# Patient Record
Sex: FEMALE | Race: WHITE | HISPANIC OR LATINO | Employment: UNEMPLOYED | URBAN - METROPOLITAN AREA
[De-identification: names, ages, dates, MRNs, and addresses within clinical notes are randomized per-mention and may not be internally consistent; named-entity substitution may affect disease eponyms.]

---

## 2017-11-30 ENCOUNTER — OFFICE VISIT (OUTPATIENT)
Dept: OBSTETRICS AND GYNECOLOGY | Facility: CLINIC | Age: 62
End: 2017-11-30
Payer: MEDICAID

## 2017-11-30 VITALS
BODY MASS INDEX: 27.22 KG/M2 | SYSTOLIC BLOOD PRESSURE: 134 MMHG | HEIGHT: 61 IN | DIASTOLIC BLOOD PRESSURE: 80 MMHG | WEIGHT: 144.19 LBS

## 2017-11-30 DIAGNOSIS — Z01.419 WELL WOMAN EXAM WITH ROUTINE GYNECOLOGICAL EXAM: ICD-10-CM

## 2017-11-30 DIAGNOSIS — Z12.4 ROUTINE PAPANICOLAOU SMEAR: ICD-10-CM

## 2017-11-30 DIAGNOSIS — Z12.39 BREAST CANCER SCREENING: Primary | ICD-10-CM

## 2017-11-30 PROCEDURE — 99386 PREV VISIT NEW AGE 40-64: CPT | Mod: S$PBB,,, | Performed by: OBSTETRICS & GYNECOLOGY

## 2017-11-30 PROCEDURE — 88175 CYTOPATH C/V AUTO FLUID REDO: CPT

## 2017-11-30 PROCEDURE — 99203 OFFICE O/P NEW LOW 30 MIN: CPT | Mod: PBBFAC,PO | Performed by: OBSTETRICS & GYNECOLOGY

## 2017-11-30 PROCEDURE — 99999 PR PBB SHADOW E&M-NEW PATIENT-LVL III: CPT | Mod: PBBFAC,,, | Performed by: OBSTETRICS & GYNECOLOGY

## 2017-11-30 RX ORDER — NATEGLINIDE 60 MG/1
60 TABLET ORAL
COMMUNITY

## 2017-11-30 RX ORDER — METOPROLOL SUCCINATE 25 MG/1
25 TABLET, EXTENDED RELEASE ORAL DAILY
COMMUNITY

## 2017-11-30 NOTE — PROGRESS NOTES
"HPI:   62 y.o.   OB History      Para Term  AB Living    3 3       3    SAB TAB Ectopic Multiple Live Births                    No LMP recorded. Patient is postmenopausal.    Patient is  here for her annual gynecologic exam.  She has no complaints.     ROS:  GENERAL: No fever, chills, fatigability or weight loss.  SKIN: No rashes, itching or changes in color or texture of skin.  HEAD: No headaches or recent head trauma.  EYES: Visual acuity fine. No photophobia, ocular pain or diplopia.  EARS: Denies ear pain, discharge or vertigo.  NOSE: No loss of smell, no epistaxis or postnasal drip.  MOUTH & THROAT: No hoarseness or change in voice. No excessive gum bleeding.  NODES: Denies swollen glands.  CHEST: Denies RENEE, cyanosis, wheezing, cough and sputum production.  CARDIOVASCULAR: Denies chest pain, PND, orthopnea or reduced exercise tolerance.  ABDOMEN: Appetite fine. No weight loss. Denies diarrhea, abdominal pain, hematemesis or blood in stool.  URINARY: No flank pain, dysuria or hematuria.  PERIPHERAL VASCULAR: No claudication or cyanosis.  MUSCULOSKELETAL: No joint stiffness or swelling. Denies back pain.  NEUROLOGIC: No history of seizures, paralysis, alteration of gait or coordination.    PE:   /80   Ht 5' 1" (1.549 m)   Wt 65.4 kg (144 lb 2.9 oz)   BMI 27.24 kg/m²   APPEARANCE: Well nourished, well developed, in no acute distress.  NECK: Neck symmetric without masses or thyromegaly.  BREASTS: Symmetrical, no skin changes or visible lesions. No palpable masses, nipple discharge or adenopathy bilaterally.  ABDOMEN: Flat. Soft. No tenderness or masses. No hepatosplenomegaly. No hernias. No CVA tenderness.  VULVA: No lesions. Normal female genitalia.  URETHRAL MEATUS: Normal size and location, no lesions, no prolapse.  URETHRA: No masses, tenderness, prolapse or scarring.  VAGINA: Moist and well rugated, no discharge, no significant cystocele or rectocele.  CERVIX: No lesions and discharge. " PAP done.  UTERUS: Normal size, regular shape, mobile, non-tender, bladder base nontender.  ADNEXA: No masses, tenderness or CDS nodularity.  ANUS PERINEUM: Normal.    PROCEDURES:  Pap smear    Assessment:  Normal Gynecologic Exam    Plan:  Mammogram and Colonoscopy if indicated by current recommendations.  Return to clinic in one year or for any problems or complaints.  No bleeding or gyn co  mammo

## 2017-12-18 ENCOUNTER — HOSPITAL ENCOUNTER (OUTPATIENT)
Dept: RADIOLOGY | Facility: HOSPITAL | Age: 62
Discharge: HOME | End: 2017-12-18
Attending: OBSTETRICS & GYNECOLOGY
Payer: MEDICAID

## 2017-12-18 DIAGNOSIS — Z12.39 BREAST CANCER SCREENING: ICD-10-CM

## 2017-12-18 PROCEDURE — 77067 SCR MAMMO BI INCL CAD: CPT | Mod: TC

## 2017-12-18 PROCEDURE — 77067 SCR MAMMO BI INCL CAD: CPT | Mod: 26,,, | Performed by: RADIOLOGY

## 2017-12-18 PROCEDURE — 77063 BREAST TOMOSYNTHESIS BI: CPT | Mod: 26,,, | Performed by: RADIOLOGY

## 2022-05-04 ENCOUNTER — TELEPHONE (OUTPATIENT)
Dept: OPHTHALMOLOGY | Facility: CLINIC | Age: 67
End: 2022-05-04
Payer: MEDICAID

## 2022-05-19 ENCOUNTER — OFFICE VISIT (OUTPATIENT)
Dept: OPHTHALMOLOGY | Facility: CLINIC | Age: 67
End: 2022-05-19
Payer: MEDICARE

## 2022-05-19 DIAGNOSIS — H04.163 PROLAPSE OF LACRIMAL GLAND, BILATERAL: ICD-10-CM

## 2022-05-19 DIAGNOSIS — H02.59 LAXITY OF EYELID: ICD-10-CM

## 2022-05-19 DIAGNOSIS — H02.413 ACQUIRED MECHANICAL PTOSIS OF EYELID, BILATERAL: Primary | ICD-10-CM

## 2022-05-19 DIAGNOSIS — H02.832 DERMATOCHALASIS OF BOTH LOWER EYELIDS: ICD-10-CM

## 2022-05-19 DIAGNOSIS — H02.835 DERMATOCHALASIS OF BOTH LOWER EYELIDS: ICD-10-CM

## 2022-05-19 PROCEDURE — 1101F PR PT FALLS ASSESS DOC 0-1 FALLS W/OUT INJ PAST YR: ICD-10-PCS | Mod: CPTII,S$GLB,, | Performed by: OPHTHALMOLOGY

## 2022-05-19 PROCEDURE — 1101F PT FALLS ASSESS-DOCD LE1/YR: CPT | Mod: CPTII,S$GLB,, | Performed by: OPHTHALMOLOGY

## 2022-05-19 PROCEDURE — 3288F FALL RISK ASSESSMENT DOCD: CPT | Mod: CPTII,S$GLB,, | Performed by: OPHTHALMOLOGY

## 2022-05-19 PROCEDURE — 1159F MED LIST DOCD IN RCRD: CPT | Mod: CPTII,S$GLB,, | Performed by: OPHTHALMOLOGY

## 2022-05-19 PROCEDURE — 1126F PR PAIN SEVERITY QUANTIFIED, NO PAIN PRESENT: ICD-10-PCS | Mod: CPTII,S$GLB,, | Performed by: OPHTHALMOLOGY

## 2022-05-19 PROCEDURE — 3288F PR FALLS RISK ASSESSMENT DOCUMENTED: ICD-10-PCS | Mod: CPTII,S$GLB,, | Performed by: OPHTHALMOLOGY

## 2022-05-19 PROCEDURE — 99204 OFFICE O/P NEW MOD 45 MIN: CPT | Mod: S$GLB,,, | Performed by: OPHTHALMOLOGY

## 2022-05-19 PROCEDURE — 1160F RVW MEDS BY RX/DR IN RCRD: CPT | Mod: CPTII,S$GLB,, | Performed by: OPHTHALMOLOGY

## 2022-05-19 PROCEDURE — 99999 PR PBB SHADOW E&M-EST. PATIENT-LVL II: CPT | Mod: PBBFAC,,, | Performed by: OPHTHALMOLOGY

## 2022-05-19 PROCEDURE — 1159F PR MEDICATION LIST DOCUMENTED IN MEDICAL RECORD: ICD-10-PCS | Mod: CPTII,S$GLB,, | Performed by: OPHTHALMOLOGY

## 2022-05-19 PROCEDURE — 1160F PR REVIEW ALL MEDS BY PRESCRIBER/CLIN PHARMACIST DOCUMENTED: ICD-10-PCS | Mod: CPTII,S$GLB,, | Performed by: OPHTHALMOLOGY

## 2022-05-19 PROCEDURE — 1126F AMNT PAIN NOTED NONE PRSNT: CPT | Mod: CPTII,S$GLB,, | Performed by: OPHTHALMOLOGY

## 2022-05-19 PROCEDURE — 99204 PR OFFICE/OUTPT VISIT, NEW, LEVL IV, 45-59 MIN: ICD-10-PCS | Mod: S$GLB,,, | Performed by: OPHTHALMOLOGY

## 2022-05-19 PROCEDURE — 99999 PR PBB SHADOW E&M-EST. PATIENT-LVL II: ICD-10-PCS | Mod: PBBFAC,,, | Performed by: OPHTHALMOLOGY

## 2022-05-19 NOTE — PROGRESS NOTES
ZOEY Keller is a/an 67 y.o. female here for ptosis eval.  Referred by:   How long have eyelid(s) been droopy? 5+ Years  Any h/o wearing hard CLs? No  Do eyelids feel heavy? Yes OS>OD  Does the height of the eyelid(s) fluctuate throughout the day? Yes  Do eyelid(s) interfere with daily activities such as driving, reading,   working? Yes  Spontaneous orbital pain? No  Orbital pain w eye movement? No  Red eyes? Yes  Red eyelids? No  Eyelid swelling? No     EYE MEDS:   Art tears 1 gtt PRN OU       Last edited by Kirstie Rodriguez on 5/19/2022  3:16 PM. (History)            Assessment /Plan     For exam results, see Encounter Report.    Acquired mechanical ptosis of eyelid, bilateral  -     Goldmann Perimetry - OU - Extended - Both Eyes  -     External/Slit Lamp Photography    Prolapse of lacrimal gland, bilateral    Dermatochalasis of both lower eyelids    Laxity of eyelid      The patient is a pleasant 67-year-old female here for evaluation of bilateral upper eyelid heaviness worse on the left than the right.  This has been progressive and affects activities of daily living.  The patient has a history of bilateral cataract extraction with my colleague Dr. Stone approximately 2 years ago.  She is here for surgical evaluation.    On exam, the patient has chronic frontalis use.  She has very minimal bilateral temporal brow ptosis slightly worse on the left than the right.  She has bilateral upper eyelid mechanical ptosis with skin lash touch.  She has bilateral lacrimal gland prolapse more prominent on the right versus the left.  She has bilateral lower eyelid dermatochalasis with herniation of orbital fat more prominently on the right than the left.  She has moderate to severe lateral canthal laxity.  She has a left lower eyelid fluid-filled cyst at the temporal margin.    Pt. With significant improvement of superior visual fields with lids and brows taped vs. Untaped.    These findings were discussed  with the patient and her .    Recommend bilateral upper eyelid blepharoplasty and bilateral lacrimal gland plication along with excisional biopsy of the left lower eyelid cyst.    Option of cosmetic bilateral lower eyelid subjective blepharoplasty with pinch and canthoplasty was discussed with the patient.  She will decide dependent upon the cost the procedure.    Informed consent obtained after extensive risks/benefits/alternatives were discussed with the patient including but not limited to pain, bleeding, infection, ocular injury, loss of the eye, asymmetry, need for revision in future, scarring.  Alternatives such as waiting were discussed.  All questions were answered.      Hold ASA, NSAIDS, and fish oil 5 to 7 days prior to procedure.    Return for surgery     Return to Dr. Stone for routine eye care.

## 2022-05-27 ENCOUNTER — TELEPHONE (OUTPATIENT)
Dept: OPHTHALMOLOGY | Facility: CLINIC | Age: 67
End: 2022-05-27
Payer: MEDICARE

## 2022-05-27 DIAGNOSIS — H02.59 LAXITY OF EYELID: ICD-10-CM

## 2022-05-27 DIAGNOSIS — H02.413 ACQUIRED MECHANICAL PTOSIS OF EYELID, BILATERAL: Primary | ICD-10-CM

## 2022-05-27 DIAGNOSIS — H02.832 DERMATOCHALASIS OF BOTH LOWER EYELIDS: ICD-10-CM

## 2022-05-27 DIAGNOSIS — H04.163 PROLAPSE OF LACRIMAL GLAND, BILATERAL: ICD-10-CM

## 2022-05-27 DIAGNOSIS — H02.835 DERMATOCHALASIS OF BOTH LOWER EYELIDS: ICD-10-CM

## 2022-11-03 ENCOUNTER — TELEPHONE (OUTPATIENT)
Dept: OPHTHALMOLOGY | Facility: CLINIC | Age: 67
End: 2022-11-03
Payer: MEDICARE

## 2022-11-10 ENCOUNTER — TELEPHONE (OUTPATIENT)
Dept: OPHTHALMOLOGY | Facility: CLINIC | Age: 67
End: 2022-11-10
Payer: MEDICARE

## 2022-11-11 ENCOUNTER — ANESTHESIA (OUTPATIENT)
Dept: SURGERY | Facility: HOSPITAL | Age: 67
End: 2022-11-11
Payer: MEDICARE

## 2022-11-11 ENCOUNTER — HOSPITAL ENCOUNTER (OUTPATIENT)
Facility: HOSPITAL | Age: 67
Discharge: HOME | End: 2022-11-11
Attending: OPHTHALMOLOGY | Admitting: OPHTHALMOLOGY
Payer: MEDICARE

## 2022-11-11 ENCOUNTER — ANESTHESIA EVENT (OUTPATIENT)
Dept: SURGERY | Facility: HOSPITAL | Age: 67
End: 2022-11-11
Payer: MEDICARE

## 2022-11-11 VITALS
OXYGEN SATURATION: 100 % | BODY MASS INDEX: 30.21 KG/M2 | TEMPERATURE: 99 F | DIASTOLIC BLOOD PRESSURE: 61 MMHG | WEIGHT: 160 LBS | SYSTOLIC BLOOD PRESSURE: 128 MMHG | HEIGHT: 61 IN | RESPIRATION RATE: 16 BRPM | HEART RATE: 80 BPM

## 2022-11-11 DIAGNOSIS — H02.413 ACQUIRED MECHANICAL PTOSIS OF EYELID, BILATERAL: Primary | ICD-10-CM

## 2022-11-11 LAB — POCT GLUCOSE: 184 MG/DL (ref 70–110)

## 2022-11-11 PROCEDURE — 68899: ICD-10-PCS | Mod: 51,50,, | Performed by: OPHTHALMOLOGY

## 2022-11-11 PROCEDURE — D9220A PRA ANESTHESIA: ICD-10-PCS | Mod: ANES,,, | Performed by: STUDENT IN AN ORGANIZED HEALTH CARE EDUCATION/TRAINING PROGRAM

## 2022-11-11 PROCEDURE — 88305 TISSUE EXAM BY PATHOLOGIST: CPT | Mod: 26,,, | Performed by: PATHOLOGY

## 2022-11-11 PROCEDURE — 82962 GLUCOSE BLOOD TEST: CPT | Performed by: OPHTHALMOLOGY

## 2022-11-11 PROCEDURE — 71000044 HC DOSC ROUTINE RECOVERY FIRST HOUR: Performed by: OPHTHALMOLOGY

## 2022-11-11 PROCEDURE — D9220A PRA ANESTHESIA: Mod: CRNA,,, | Performed by: REGISTERED NURSE

## 2022-11-11 PROCEDURE — 36000706: Performed by: OPHTHALMOLOGY

## 2022-11-11 PROCEDURE — D9220A PRA ANESTHESIA: ICD-10-PCS | Mod: CRNA,,, | Performed by: REGISTERED NURSE

## 2022-11-11 PROCEDURE — 25000003 PHARM REV CODE 250: Performed by: REGISTERED NURSE

## 2022-11-11 PROCEDURE — 15823 BLEPHARP UPR EYELID XCSV SKN: CPT | Mod: 50,,, | Performed by: OPHTHALMOLOGY

## 2022-11-11 PROCEDURE — 71000016 HC POSTOP RECOV ADDL HR: Performed by: OPHTHALMOLOGY

## 2022-11-11 PROCEDURE — 36000707: Performed by: OPHTHALMOLOGY

## 2022-11-11 PROCEDURE — 11440 PR EXC SKIN BENIG <0.5 CM FACE,FACIAL: ICD-10-PCS | Mod: 51,LT,, | Performed by: OPHTHALMOLOGY

## 2022-11-11 PROCEDURE — 15823 PR REV UPPER EYELID W EXCESS SKIN: ICD-10-PCS | Mod: 50,,, | Performed by: OPHTHALMOLOGY

## 2022-11-11 PROCEDURE — 25000003 PHARM REV CODE 250: Performed by: OPHTHALMOLOGY

## 2022-11-11 PROCEDURE — 63600175 PHARM REV CODE 636 W HCPCS: Performed by: OPHTHALMOLOGY

## 2022-11-11 PROCEDURE — 27201423 OPTIME MED/SURG SUP & DEVICES STERILE SUPPLY: Performed by: OPHTHALMOLOGY

## 2022-11-11 PROCEDURE — 63600175 PHARM REV CODE 636 W HCPCS: Performed by: REGISTERED NURSE

## 2022-11-11 PROCEDURE — 71000015 HC POSTOP RECOV 1ST HR: Performed by: OPHTHALMOLOGY

## 2022-11-11 PROCEDURE — 37000008 HC ANESTHESIA 1ST 15 MINUTES: Performed by: OPHTHALMOLOGY

## 2022-11-11 PROCEDURE — 25000003 PHARM REV CODE 250

## 2022-11-11 PROCEDURE — D9220A PRA ANESTHESIA: Mod: ANES,,, | Performed by: STUDENT IN AN ORGANIZED HEALTH CARE EDUCATION/TRAINING PROGRAM

## 2022-11-11 PROCEDURE — 88305 TISSUE EXAM BY PATHOLOGIST: ICD-10-PCS | Mod: 26,,, | Performed by: PATHOLOGY

## 2022-11-11 PROCEDURE — 88305 TISSUE EXAM BY PATHOLOGIST: CPT | Performed by: PATHOLOGY

## 2022-11-11 PROCEDURE — 68899 UNLISTED PX LACRIMAL SYSTEM: CPT | Mod: 51,50,, | Performed by: OPHTHALMOLOGY

## 2022-11-11 PROCEDURE — 11440 EXC FACE-MM B9+MARG 0.5 CM/<: CPT | Mod: 51,LT,, | Performed by: OPHTHALMOLOGY

## 2022-11-11 PROCEDURE — 37000009 HC ANESTHESIA EA ADD 15 MINS: Performed by: OPHTHALMOLOGY

## 2022-11-11 RX ORDER — BUPIVACAINE HYDROCHLORIDE 5 MG/ML
INJECTION, SOLUTION EPIDURAL; INTRACAUDAL
Status: DISCONTINUED
Start: 2022-11-11 | End: 2022-11-11 | Stop reason: HOSPADM

## 2022-11-11 RX ORDER — LIDOCAINE HYDROCHLORIDE 20 MG/ML
INJECTION INTRAVENOUS
Status: DISCONTINUED | OUTPATIENT
Start: 2022-11-11 | End: 2022-11-11

## 2022-11-11 RX ORDER — EPINEPHRINE 1 MG/ML
INJECTION, SOLUTION, CONCENTRATE INTRAVENOUS
Status: DISCONTINUED
Start: 2022-11-11 | End: 2022-11-11 | Stop reason: HOSPADM

## 2022-11-11 RX ORDER — ACETAMINOPHEN 10 MG/ML
INJECTION, SOLUTION INTRAVENOUS
Status: DISCONTINUED | OUTPATIENT
Start: 2022-11-11 | End: 2022-11-11

## 2022-11-11 RX ORDER — HYDROCODONE BITARTRATE AND ACETAMINOPHEN 5; 325 MG/1; MG/1
1 TABLET ORAL EVERY 6 HOURS PRN
Qty: 16 TABLET | Refills: 0 | Status: SHIPPED | OUTPATIENT
Start: 2022-11-11

## 2022-11-11 RX ORDER — TOBRAMYCIN AND DEXAMETHASONE 3; 1 MG/ML; MG/ML
SUSPENSION/ DROPS OPHTHALMIC
Status: DISCONTINUED
Start: 2022-11-11 | End: 2022-11-11 | Stop reason: WASHOUT

## 2022-11-11 RX ORDER — PROPOFOL 10 MG/ML
VIAL (ML) INTRAVENOUS
Status: DISCONTINUED | OUTPATIENT
Start: 2022-11-11 | End: 2022-11-11

## 2022-11-11 RX ORDER — NEOMYCIN SULFATE, POLYMYXIN B SULFATE, AND DEXAMETHASONE 3.5; 10000; 1 MG/G; [USP'U]/G; MG/G
OINTMENT OPHTHALMIC EVERY 8 HOURS
Qty: 3.5 G | Refills: 2 | Status: SHIPPED | OUTPATIENT
Start: 2022-11-11

## 2022-11-11 RX ORDER — CEFAZOLIN SODIUM/WATER 2 G/20 ML
SYRINGE (ML) INTRAVENOUS
Status: DISCONTINUED
Start: 2022-11-11 | End: 2022-11-11 | Stop reason: HOSPADM

## 2022-11-11 RX ORDER — LIDOCAINE HYDROCHLORIDE 10 MG/ML
INJECTION, SOLUTION EPIDURAL; INFILTRATION; INTRACAUDAL; PERINEURAL
Status: DISCONTINUED | OUTPATIENT
Start: 2022-11-11 | End: 2022-11-11 | Stop reason: HOSPADM

## 2022-11-11 RX ORDER — BUPIVACAINE HYDROCHLORIDE 5 MG/ML
INJECTION, SOLUTION EPIDURAL; INTRACAUDAL
Status: DISCONTINUED | OUTPATIENT
Start: 2022-11-11 | End: 2022-11-11 | Stop reason: HOSPADM

## 2022-11-11 RX ORDER — EPINEPHRINE CONVENIENCE KIT 1 MG/ML(1)
KIT INTRAMUSCULAR; SUBCUTANEOUS
Status: DISCONTINUED | OUTPATIENT
Start: 2022-11-11 | End: 2022-11-11 | Stop reason: HOSPADM

## 2022-11-11 RX ORDER — TOBRAMYCIN AND DEXAMETHASONE 3; 1 MG/ML; MG/ML
SUSPENSION/ DROPS OPHTHALMIC
Status: DISCONTINUED | OUTPATIENT
Start: 2022-11-11 | End: 2022-11-11 | Stop reason: HOSPADM

## 2022-11-11 RX ORDER — VASOPRESSIN 20 [USP'U]/ML
INJECTION, SOLUTION INTRAMUSCULAR; SUBCUTANEOUS
Status: DISCONTINUED | OUTPATIENT
Start: 2022-11-11 | End: 2022-11-11

## 2022-11-11 RX ORDER — TETRACAINE HYDROCHLORIDE 5 MG/ML
1 SOLUTION OPHTHALMIC ONCE
Status: DISCONTINUED | OUTPATIENT
Start: 2022-11-11 | End: 2022-11-11

## 2022-11-11 RX ORDER — ONDANSETRON 2 MG/ML
INJECTION INTRAMUSCULAR; INTRAVENOUS
Status: DISCONTINUED | OUTPATIENT
Start: 2022-11-11 | End: 2022-11-11

## 2022-11-11 RX ORDER — TETRACAINE HYDROCHLORIDE 5 MG/ML
1 SOLUTION OPHTHALMIC ONCE
Status: COMPLETED | OUTPATIENT
Start: 2022-11-11 | End: 2022-11-11

## 2022-11-11 RX ORDER — DEXAMETHASONE SODIUM PHOSPHATE 4 MG/ML
INJECTION, SOLUTION INTRA-ARTICULAR; INTRALESIONAL; INTRAMUSCULAR; INTRAVENOUS; SOFT TISSUE
Status: DISCONTINUED | OUTPATIENT
Start: 2022-11-11 | End: 2022-11-11

## 2022-11-11 RX ORDER — HYDROCODONE BITARTRATE AND ACETAMINOPHEN 5; 325 MG/1; MG/1
1 TABLET ORAL EVERY 4 HOURS PRN
Status: DISCONTINUED | OUTPATIENT
Start: 2022-11-11 | End: 2022-11-11 | Stop reason: HOSPADM

## 2022-11-11 RX ORDER — LIDOCAINE HYDROCHLORIDE 10 MG/ML
INJECTION INFILTRATION; PERINEURAL
Status: DISCONTINUED
Start: 2022-11-11 | End: 2022-11-11 | Stop reason: HOSPADM

## 2022-11-11 RX ORDER — HYDROMORPHONE HYDROCHLORIDE 1 MG/ML
0.2 INJECTION, SOLUTION INTRAMUSCULAR; INTRAVENOUS; SUBCUTANEOUS EVERY 5 MIN PRN
Status: DISCONTINUED | OUTPATIENT
Start: 2022-11-11 | End: 2022-11-11 | Stop reason: HOSPADM

## 2022-11-11 RX ORDER — PHENYLEPHRINE HYDROCHLORIDE 10 MG/ML
INJECTION INTRAVENOUS
Status: DISCONTINUED | OUTPATIENT
Start: 2022-11-11 | End: 2022-11-11

## 2022-11-11 RX ORDER — DEXMEDETOMIDINE HYDROCHLORIDE 100 UG/ML
INJECTION, SOLUTION INTRAVENOUS
Status: DISCONTINUED | OUTPATIENT
Start: 2022-11-11 | End: 2022-11-11

## 2022-11-11 RX ORDER — MIDAZOLAM HYDROCHLORIDE 1 MG/ML
INJECTION INTRAMUSCULAR; INTRAVENOUS
Status: DISCONTINUED | OUTPATIENT
Start: 2022-11-11 | End: 2022-11-11

## 2022-11-11 RX ORDER — FENTANYL CITRATE 50 UG/ML
INJECTION, SOLUTION INTRAMUSCULAR; INTRAVENOUS
Status: DISCONTINUED | OUTPATIENT
Start: 2022-11-11 | End: 2022-11-11

## 2022-11-11 RX ORDER — SODIUM CHLORIDE 0.9 % (FLUSH) 0.9 %
10 SYRINGE (ML) INJECTION
Status: DISCONTINUED | OUTPATIENT
Start: 2022-11-11 | End: 2022-11-11 | Stop reason: HOSPADM

## 2022-11-11 RX ADMIN — DEXAMETHASONE SODIUM PHOSPHATE 8 MG: 4 INJECTION, SOLUTION INTRAMUSCULAR; INTRAVENOUS at 10:11

## 2022-11-11 RX ADMIN — PHENYLEPHRINE HYDROCHLORIDE 100 MCG: 10 INJECTION INTRAVENOUS at 11:11

## 2022-11-11 RX ADMIN — PROPOFOL 50 MG: 10 INJECTION, EMULSION INTRAVENOUS at 10:11

## 2022-11-11 RX ADMIN — TETRACAINE HYDROCHLORIDE 1 DROP: 5 SOLUTION OPHTHALMIC at 08:11

## 2022-11-11 RX ADMIN — PHENYLEPHRINE HYDROCHLORIDE 100 MCG: 10 INJECTION INTRAVENOUS at 10:11

## 2022-11-11 RX ADMIN — ACETAMINOPHEN 1000 MG: 10 INJECTION, SOLUTION INTRAVENOUS at 11:11

## 2022-11-11 RX ADMIN — Medication 2 G: at 10:11

## 2022-11-11 RX ADMIN — MIDAZOLAM HYDROCHLORIDE 2 MG: 1 INJECTION, SOLUTION INTRAMUSCULAR; INTRAVENOUS at 09:11

## 2022-11-11 RX ADMIN — HYDROCODONE BITARTRATE AND ACETAMINOPHEN 1 TABLET: 5; 325 TABLET ORAL at 01:11

## 2022-11-11 RX ADMIN — VASOPRESSIN 0.8 UNITS: 20 INJECTION INTRAVENOUS at 11:11

## 2022-11-11 RX ADMIN — VASOPRESSIN 0.4 UNITS: 20 INJECTION INTRAVENOUS at 11:11

## 2022-11-11 RX ADMIN — FENTANYL CITRATE 25 MCG: 50 INJECTION, SOLUTION INTRAMUSCULAR; INTRAVENOUS at 10:11

## 2022-11-11 RX ADMIN — FENTANYL CITRATE 25 MCG: 50 INJECTION, SOLUTION INTRAMUSCULAR; INTRAVENOUS at 11:11

## 2022-11-11 RX ADMIN — PROPOFOL 200 MG: 10 INJECTION, EMULSION INTRAVENOUS at 10:11

## 2022-11-11 RX ADMIN — SODIUM CHLORIDE: 0.9 INJECTION, SOLUTION INTRAVENOUS at 09:11

## 2022-11-11 RX ADMIN — LIDOCAINE HYDROCHLORIDE 100 MG: 20 INJECTION INTRAVENOUS at 10:11

## 2022-11-11 RX ADMIN — ONDANSETRON 4 MG: 2 INJECTION INTRAMUSCULAR; INTRAVENOUS at 11:11

## 2022-11-11 RX ADMIN — FENTANYL CITRATE 50 MCG: 50 INJECTION, SOLUTION INTRAMUSCULAR; INTRAVENOUS at 10:11

## 2022-11-11 RX ADMIN — DEXMEDETOMIDINE HYDROCHLORIDE 8 MCG: 100 INJECTION, SOLUTION INTRAVENOUS at 11:11

## 2022-11-11 NOTE — INTERVAL H&P NOTE
No interval change in h and p. Proceed as planned.     PE:    HEENT: Normocephalic, atraumatic  CV: RRR nl s1 and s2  Chest: CTA-B  Abd: s/nt/nd  Ext: warm, perfused

## 2022-11-11 NOTE — ANESTHESIA PROCEDURE NOTES
Intubation    Date/Time: 11/11/2022 10:09 AM  Performed by: Kashmir Mijares CRNA  Authorized by: Shavon Quach MD     Intubation:     Induction:  Intravenous    Intubated:  Postinduction    Mask Ventilation:  Easy mask    Attempts:  1    Attempted By:  CRNA    Difficult Airway Encountered?: No      Complications:  None    Airway Device:  Supraglottic airway/LMA    Airway Device Size:  3.5 (airQ)    Style/Cuff Inflation:  Cuffed (inflated to minimal occlusive pressure)    Secured at:  The lips    Placement Verified By:  Capnometry    Complicating Factors:  None    Findings Post-Intubation:  Atraumatic/condition of teeth unchanged

## 2022-11-11 NOTE — OP NOTE
11/11/22  Attending: Roseanna Larry  Resident: none  Pre-op Dx: bilateral mechanical ptosis, bilateral lacrimal gland prolapse, left lower eyelid cyst   Post-op Dx: same  Procedure: bilateral lacrimal gland plication, bilateral blepharoplasty, excision of left lower eyelid cyst   Anesthesia: Local and General 1% lidocaine with epinephrine, 0.5% marcaine, and vitrase  Ebl: less than 5 cc   Specimens: left lower eyelid lesion  Complications: none      Indications for surgery: 67 y.o. female with significant impairment of superior visual fields with prolapse of bilateral lacrimal glands. Informed consent obtained after extensive risks/benefits/alternatives were discussed with the patient including but not limited to pain, bleeding, infection, ocular injury, loss of the eye, asymmetry, need for revision in future, scarring.  Alternatives such as waiting were discussed.  All questions were answered.       The eyelids were marked with a marking pen within the eyelid creases and from the brow 10 mm medially, centrally, and laterally. Local anesthetic was injected into both upper eyelids.The patient was prepped and draped in the usual sterile manner for ophthalmic plastic surgery. Corneal shields were placed in each eye. A yang scissor was used to excise the left lower eyelid lesion and sent for pathology. Attention was first placed to the right eyelid. A 4-0 Silk traction suture was placed into the eyelid. A #15 blade was used to incise the marked skin and then raise and remove the blepharoplasty skin, taking care to preserve the underlying orbicularis oculi. Hemostasis was achieved with bovie electrocautery. Sharp Yang scissor were then used to incise the orbicularis and orbital septum exposing the medial and central fat pads and the prolapsed lacrimal gland.Monopolar cautery was used to remove the superior septal fibers releasing the medial fat pad. A small pedicle of the medial fat pad was exposed, and  electrocautery was used to excise this pedicle at its base. The lacrimal gland was visualized and injected with 0.5 cc of local anesthetic. The lacrimal gland was teased away from the surrounding orbital fat. Next, three interrupted 6-0 prolene sutures were placed through the anterior lacrimal gland and then anchored back to the rim of the lacrimal gland fossa and tied.  After hemostasis was ensured, a 6-0 prolene running suture was used to close the skin of the incisions. This procedure was repeated in its entirety on the left upper eyelid. The corneal shields were removed and traction sutures cut and removed. Tobradex ointment was applied to the wounds and onto the eyes. The patient tolerated the procedure well. There were no complications.

## 2022-11-11 NOTE — TRANSFER OF CARE
"Anesthesia Transfer of Care Note    Patient: Kelin Keller    Procedure(s) Performed: Procedure(s) (LRB):  BLEPHAROPLASTY, UPPER EYELID (Bilateral)  EXPLORATION, ORBIT (Bilateral)  EXCISION, LESION, EYELID (Left)    Patient location: PACU    Anesthesia Type: general    Transport from OR: Transported from OR on 6-10 L/min O2 by face mask with adequate spontaneous ventilation    Post pain: adequate analgesia    Post assessment: no apparent anesthetic complications and tolerated procedure well    Post vital signs: stable    Level of consciousness: sedated and responds to stimulation    Nausea/Vomiting: no nausea/vomiting    Complications: none    Transfer of care protocol was followed      Last vitals:   Visit Vitals  BP (!) 176/80 (BP Location: Left arm, Patient Position: Lying)   Pulse 92   Temp 37.2 °C (98.9 °F) (Temporal)   Resp 18   Ht 5' 1" (1.549 m)   Wt 72.6 kg (160 lb)   LMP 01/01/2006   SpO2 98%   Breastfeeding No   BMI 30.23 kg/m²     "

## 2022-11-11 NOTE — H&P
Past Medical History:   Diagnosis Date    Diabetes mellitus     Hypertension        Past Surgical History:   Procedure Laterality Date    deverticulitis  11/03/2003       No family history on file.    Social History     Socioeconomic History    Marital status:    Tobacco Use    Smoking status: Never    Smokeless tobacco: Never   Substance and Sexual Activity    Alcohol use: No    Drug use: No    Sexual activity: Never       No current facility-administered medications for this encounter.     Current Outpatient Medications   Medication Sig Dispense Refill    ESOMEPRAZOLE MAGNESIUM (NEXIUM 24HR ORAL) Take by mouth.      metoprolol succinate (TOPROL-XL) 25 MG 24 hr tablet Take 25 mg by mouth once daily.      nateglinide (STARLIX) 60 MG tablet Take 60 mg by mouth 3 (three) times daily before meals.         Review of patient's allergies indicates:   Allergen Reactions    Microzide [hydrochlorothiazide] Diarrhea     Potasium and sodium levels go extremely low when this medication is taken per Patient        68 yo female with bilateral mechanical ptosis.     PE:    HEENT: Normocephalic, atraumatic  CV: RRR nl s1 and s2  Chest: CTA-B  Abd: s/nt/nd  Ext: warm, perfused     A/p: To OR for bilateral blepharoplasty and lacrimal gland plication and excisional biopsy of left lower eyelid lesion.

## 2022-11-11 NOTE — ANESTHESIA POSTPROCEDURE EVALUATION
Anesthesia Post Evaluation    Patient: Kelin Keller    Procedure(s) Performed: Procedure(s) (LRB):  BLEPHAROPLASTY, UPPER EYELID (Bilateral)  EXPLORATION, ORBIT (Bilateral)  EXCISION, LESION, EYELID (Left)    Final Anesthesia Type: general      Patient location during evaluation: PACU  Patient participation: Yes- Able to Participate  Level of consciousness: awake and alert  Post-procedure vital signs: reviewed and stable  Pain management: adequate  Airway patency: patent  JAMARCUS mitigation strategies: Multimodal analgesia  PONV status at discharge: No PONV  Anesthetic complications: no      Cardiovascular status: blood pressure returned to baseline and hemodynamically stable  Respiratory status: unassisted, spontaneous ventilation and room air  Hydration status: euvolemic  Follow-up not needed.          Vitals Value Taken Time   /61 11/11/22 1316   Temp  11/11/22 1525   Pulse 80 11/11/22 1330   Resp 16 11/11/22 1330   SpO2 100 % 11/11/22 1330         No case tracking events are documented in the log.      Pain/Darwin Score: Pain Rating Prior to Med Admin: 5 (11/11/2022  1:09 PM)  Darwin Score: 9 (11/11/2022 12:15 PM)

## 2022-11-11 NOTE — BRIEF OP NOTE
Attending: Roseanna Larry  Resident: none  Pre-op Dx: bilateral mechanical ptosis, bilateral lacrimal gland prolapse  Post-op Dx: same  Procedure: bilateral lacrimal gland plication, bilateral blepharoplasty, excision of left lower eyelid cyst   Anesthesia: Local and General 1% lidocaine with epinephrine, 0.5% marcaine, and vitrase  Ebl: less than 5 cc   Specimens: left lower eyelid lesion  Complications: none

## 2022-11-11 NOTE — ANESTHESIA PREPROCEDURE EVALUATION
Pre-operative evaluation for Procedure(s) (LRB):  BLEPHAROPLASTY, UPPER EYELID (Bilateral)  EXPLORATION, ORBIT (Bilateral)  EXCISION, LESION, EYELID (Left)    Kelin Keller is a 67 y.o. female with HTN, DM and bilateral ptosis presenting for the above mentioned procedures.      There is no problem list on file for this patient.      Review of patient's allergies indicates:   Allergen Reactions    Microzide [hydrochlorothiazide] Diarrhea     Potasium and sodium levels go extremely low when this medication is taken per Patient        No current facility-administered medications on file prior to encounter.     Current Outpatient Medications on File Prior to Encounter   Medication Sig Dispense Refill    ESOMEPRAZOLE MAGNESIUM (NEXIUM 24HR ORAL) Take by mouth.      metoprolol succinate (TOPROL-XL) 25 MG 24 hr tablet Take 25 mg by mouth once daily.      nateglinide (STARLIX) 60 MG tablet Take 60 mg by mouth 3 (three) times daily before meals.         Past Surgical History:   Procedure Laterality Date    deverticulitis  11/03/2003       Social History     Socioeconomic History    Marital status:    Tobacco Use    Smoking status: Never    Smokeless tobacco: Never   Substance and Sexual Activity    Alcohol use: No    Drug use: No    Sexual activity: Never         Vital Signs Range (Last 24H):  Temp:  [37.2 °C (98.9 °F)]   Pulse:  [92]   Resp:  [18]   BP: (176)/(80)   SpO2:  [98 %]       CBC: No results for input(s): WBC, RBC, HGB, HCT, PLT, MCV, MCH, MCHC in the last 72 hours.    CMP: No results for input(s): NA, K, CL, CO2, BUN, CREATININE, GLU, MG, PHOS, CALCIUM, ALBUMIN, PROT, ALKPHOS, ALT, AST, BILITOT in the last 72 hours.    INR  No results for input(s): PT, INR, PROTIME, APTT in the last 72 hours.      Diagnostic Studies: None charted      Pre-op Assessment    I have reviewed the Patient Summary Reports.     I have reviewed the Nursing Notes. I have reviewed the NPO Status.   I have reviewed the  Medications.     Review of Systems  Anesthesia Hx:  No problems with previous Anesthesia  History of prior surgery of interest to airway management or planning: Denies Family Hx of Anesthesia complications.   Denies Personal Hx of Anesthesia complications.   Social:  No Alcohol Use, Non-Smoker    Hematology/Oncology:  Hematology Normal   Oncology Normal     EENT/Dental:EENT/Dental Normal   Cardiovascular:   Hypertension    Pulmonary:  Pulmonary Normal    Renal/:  Renal/ Normal     Hepatic/GI:  Hepatic/GI Normal    Musculoskeletal:  Musculoskeletal Normal    Neurological:  Neurology Normal    Endocrine:   Diabetes  Obesity / BMI > 30  Dermatological:  Skin Normal    Psych:  Psychiatric Normal           Physical Exam  General: Well nourished, Cooperative, Alert and Oriented  obese  Airway:  Mallampati: III   Mouth Opening: Normal  TM Distance: Normal  Tongue: Normal  Neck ROM: Normal ROM    Dental:  Intact        Anesthesia Plan  Type of Anesthesia, risks & benefits discussed:    Anesthesia Type: Gen Supraglottic Airway  Intra-op Monitoring Plan: Standard ASA Monitors  Post Op Pain Control Plan: multimodal analgesia  Induction:  IV  Airway Plan: , Post-Induction  Informed Consent: Informed consent signed with the Patient and all parties understand the risks and agree with anesthesia plan.  All questions answered. Patient consented to blood products? No  ASA Score: 2  Day of Surgery Review of History & Physical: H&P Update referred to the surgeon/provider.    Ready For Surgery From Anesthesia Perspective.     .

## 2022-11-11 NOTE — PLAN OF CARE
Discharge instructions given and explained to patient and  with verbalization of understanding all instructions. Prescription given and explained next time and doses of each medication. Patients v/s stable, denies n/v and tolerating po, rates pain level tolerable, IV removed, and family at bedside for patient discharge home.

## 2022-11-15 NOTE — DISCHARGE SUMMARY
Discharge Note  Short Stay      SUMMARY     Admit Date: 11/11/2022    Attending Physician: Roseanna Larry MD    Discharge Physician: Roseanna Larry MD    Discharge Date: 11/15/2022 4:45 PM    Final Diagnosis: Post-Op Diagnosis Codes:     * Acquired mechanical ptosis of eyelid, bilateral [H02.413]     * Prolapse of lacrimal gland, bilateral [H04.163]     * Dermatochalasis of both lower eyelids [H02.832, H02.835]     * Laxity of eyelid [H02.59]    Hospital Course: The patient underwent uncomplicated eyelid surgery under general anesthesia and was discharged after recovery to be followed as an outpatient in the clinic.    Disposition: discharged home    Patient Instructions:   Discharge Medication List as of 11/11/2022  1:38 PM        START taking these medications    Details   HYDROcodone-acetaminophen (NORCO) 5-325 mg per tablet Take 1 tablet by mouth every 6 (six) hours as needed for Pain., Starting Fri 11/11/2022, Print      neomycin-polymyxin-dexamethasone (MAXITROL) 3.5 mg/g-10,000 unit/g-0.1 % Oint Place into both eyes every 8 (eight) hours., Starting Fri 11/11/2022, Normal           CONTINUE these medications which have NOT CHANGED    Details   ESOMEPRAZOLE MAGNESIUM (NEXIUM 24HR ORAL) Take by mouth., Historical Med      metoprolol succinate (TOPROL-XL) 25 MG 24 hr tablet Take 25 mg by mouth once daily., Historical Med      nateglinide (STARLIX) 60 MG tablet Take 60 mg by mouth 3 (three) times daily before meals., Historical Med             Discharge Procedure Orders (must include Diet, Follow-up, Activity):   Discharge Procedure Orders (must include Diet, Follow-up, Activity)   Diet general     Call MD for:  temperature >100.4     Call MD for:  severe uncontrolled pain     Call MD for:  persistent nausea and vomiting

## 2022-11-17 ENCOUNTER — TELEPHONE (OUTPATIENT)
Dept: OPHTHALMOLOGY | Facility: CLINIC | Age: 67
End: 2022-11-17

## 2022-11-17 ENCOUNTER — OFFICE VISIT (OUTPATIENT)
Dept: OPHTHALMOLOGY | Facility: CLINIC | Age: 67
End: 2022-11-17
Payer: MEDICARE

## 2022-11-17 DIAGNOSIS — H02.832 DERMATOCHALASIS OF BOTH LOWER EYELIDS: ICD-10-CM

## 2022-11-17 DIAGNOSIS — H04.163 PROLAPSE OF LACRIMAL GLAND, BILATERAL: ICD-10-CM

## 2022-11-17 DIAGNOSIS — Z98.890 POST-OPERATIVE STATE: Primary | ICD-10-CM

## 2022-11-17 DIAGNOSIS — H02.835 DERMATOCHALASIS OF BOTH LOWER EYELIDS: ICD-10-CM

## 2022-11-17 DIAGNOSIS — H02.413 ACQUIRED MECHANICAL PTOSIS OF EYELID, BILATERAL: ICD-10-CM

## 2022-11-17 PROCEDURE — 99024 POSTOP FOLLOW-UP VISIT: CPT | Mod: S$GLB,,, | Performed by: OPHTHALMOLOGY

## 2022-11-17 PROCEDURE — 99999 PR PBB SHADOW E&M-EST. PATIENT-LVL II: ICD-10-PCS | Mod: PBBFAC,,, | Performed by: OPHTHALMOLOGY

## 2022-11-17 PROCEDURE — 1160F PR REVIEW ALL MEDS BY PRESCRIBER/CLIN PHARMACIST DOCUMENTED: ICD-10-PCS | Mod: CPTII,S$GLB,, | Performed by: OPHTHALMOLOGY

## 2022-11-17 PROCEDURE — 1159F MED LIST DOCD IN RCRD: CPT | Mod: CPTII,S$GLB,, | Performed by: OPHTHALMOLOGY

## 2022-11-17 PROCEDURE — 1159F PR MEDICATION LIST DOCUMENTED IN MEDICAL RECORD: ICD-10-PCS | Mod: CPTII,S$GLB,, | Performed by: OPHTHALMOLOGY

## 2022-11-17 PROCEDURE — 1160F RVW MEDS BY RX/DR IN RCRD: CPT | Mod: CPTII,S$GLB,, | Performed by: OPHTHALMOLOGY

## 2022-11-17 PROCEDURE — 99999 PR PBB SHADOW E&M-EST. PATIENT-LVL II: CPT | Mod: PBBFAC,,, | Performed by: OPHTHALMOLOGY

## 2022-11-17 PROCEDURE — 99024 PR POST-OP FOLLOW-UP VISIT: ICD-10-PCS | Mod: S$GLB,,, | Performed by: OPHTHALMOLOGY

## 2022-11-17 NOTE — PROGRESS NOTES
Assessment /Plan     For exam results, see Encounter Report.    Post-operative state  -     External Photography - OU - Both Eyes  -     SUTURE REMOVAL    Acquired mechanical ptosis of eyelid, bilateral    Dermatochalasis of both lower eyelids    Prolapse of lacrimal gland, bilateral    Patient doing well! Post-operative instructions reviewed. Sutures removed. All questions answered.  Return in 5 weeks prn sooner any worsening of vision/symptoms or any concerns.

## 2022-11-25 LAB
FINAL PATHOLOGIC DIAGNOSIS: NORMAL
Lab: NORMAL

## 2023-01-10 ENCOUNTER — OFFICE VISIT (OUTPATIENT)
Dept: OPHTHALMOLOGY | Facility: CLINIC | Age: 68
End: 2023-01-10
Payer: MEDICARE

## 2023-01-10 DIAGNOSIS — H04.163 PROLAPSE OF LACRIMAL GLAND, BILATERAL: ICD-10-CM

## 2023-01-10 DIAGNOSIS — H02.413 ACQUIRED MECHANICAL PTOSIS OF EYELID, BILATERAL: ICD-10-CM

## 2023-01-10 DIAGNOSIS — Z98.890 POST-OPERATIVE STATE: Primary | ICD-10-CM

## 2023-01-10 PROCEDURE — 1160F PR REVIEW ALL MEDS BY PRESCRIBER/CLIN PHARMACIST DOCUMENTED: ICD-10-PCS | Mod: CPTII,S$GLB,, | Performed by: OPHTHALMOLOGY

## 2023-01-10 PROCEDURE — 99999 PR PBB SHADOW E&M-EST. PATIENT-LVL II: ICD-10-PCS | Mod: PBBFAC,,, | Performed by: OPHTHALMOLOGY

## 2023-01-10 PROCEDURE — 92285 EXTERNAL PHOTOGRAPHY - OU - BOTH EYES: ICD-10-PCS | Mod: S$GLB,,, | Performed by: OPHTHALMOLOGY

## 2023-01-10 PROCEDURE — 99999 PR PBB SHADOW E&M-EST. PATIENT-LVL II: CPT | Mod: PBBFAC,,, | Performed by: OPHTHALMOLOGY

## 2023-01-10 PROCEDURE — 1159F MED LIST DOCD IN RCRD: CPT | Mod: CPTII,S$GLB,, | Performed by: OPHTHALMOLOGY

## 2023-01-10 PROCEDURE — 1160F RVW MEDS BY RX/DR IN RCRD: CPT | Mod: CPTII,S$GLB,, | Performed by: OPHTHALMOLOGY

## 2023-01-10 PROCEDURE — 1159F PR MEDICATION LIST DOCUMENTED IN MEDICAL RECORD: ICD-10-PCS | Mod: CPTII,S$GLB,, | Performed by: OPHTHALMOLOGY

## 2023-01-10 PROCEDURE — 99024 PR POST-OP FOLLOW-UP VISIT: ICD-10-PCS | Mod: S$GLB,,, | Performed by: OPHTHALMOLOGY

## 2023-01-10 PROCEDURE — 99024 POSTOP FOLLOW-UP VISIT: CPT | Mod: S$GLB,,, | Performed by: OPHTHALMOLOGY

## 2023-01-10 PROCEDURE — 92285 EXTERNAL OCULAR PHOTOGRAPHY: CPT | Mod: S$GLB,,, | Performed by: OPHTHALMOLOGY

## 2023-01-10 NOTE — PROGRESS NOTES
Assessment /Plan     For exam results, see Encounter Report.    Post-operative state  -     External Photography - OU - Both Eyes    Acquired mechanical ptosis of eyelid, bilateral    Prolapse of lacrimal gland, bilateral      Patient doing well! Post-operative instructions reviewed. All questions answered. Return prn sooner any worsening of vision/symptoms or any concerns.

## 2023-04-17 ENCOUNTER — TELEPHONE (OUTPATIENT)
Dept: OPHTHALMOLOGY | Facility: CLINIC | Age: 68
End: 2023-04-17
Payer: MEDICARE

## 2023-04-17 NOTE — TELEPHONE ENCOUNTER
----- Message from Audrey Yan sent at 4/17/2023  3:16 PM CDT -----  Regarding: appt access  Contact: self @ [phone]  Pt is calling to schedule her annual f/u but there is nothing available.  Pls call pt with an appt.

## 2024-02-20 LAB
BCS RECOMMENDATION EXT: NORMAL
BCS RECOMMENDATION EXT: NORMAL

## 2024-05-22 ENCOUNTER — OFFICE VISIT (OUTPATIENT)
Dept: URGENT CARE | Facility: CLINIC | Age: 69
End: 2024-05-22
Payer: MEDICARE

## 2024-05-22 VITALS
HEART RATE: 106 BPM | TEMPERATURE: 98 F | WEIGHT: 160.06 LBS | HEIGHT: 61 IN | BODY MASS INDEX: 30.22 KG/M2 | OXYGEN SATURATION: 95 % | SYSTOLIC BLOOD PRESSURE: 138 MMHG | DIASTOLIC BLOOD PRESSURE: 84 MMHG | RESPIRATION RATE: 19 BRPM

## 2024-05-22 DIAGNOSIS — T88.7XXA MEDICATION SIDE EFFECT: Primary | ICD-10-CM

## 2024-05-22 DIAGNOSIS — R11.0 NAUSEA: ICD-10-CM

## 2024-05-22 PROCEDURE — 99203 OFFICE O/P NEW LOW 30 MIN: CPT | Mod: S$GLB,,,

## 2024-05-22 PROCEDURE — S0119 ONDANSETRON 4 MG: HCPCS | Mod: S$GLB,,,

## 2024-05-22 RX ORDER — ONDANSETRON 4 MG/1
4 TABLET, ORALLY DISINTEGRATING ORAL
Status: COMPLETED | OUTPATIENT
Start: 2024-05-22 | End: 2024-05-22

## 2024-05-22 RX ORDER — ONDANSETRON 4 MG/1
4 TABLET, ORALLY DISINTEGRATING ORAL ONCE
Qty: 14 TABLET | Refills: 0 | Status: SHIPPED | OUTPATIENT
Start: 2024-05-22 | End: 2024-05-22

## 2024-05-22 RX ADMIN — ONDANSETRON 4 MG: 4 TABLET, ORALLY DISINTEGRATING ORAL at 03:05

## 2024-05-22 NOTE — PROGRESS NOTES
"Subjective:      Patient ID: Kelin Keller is a 69 y.o. female.    Vitals:  height is 5' 1" (1.549 m) and weight is 72.6 kg (160 lb 0.9 oz). Her oral temperature is 97.9 °F (36.6 °C). Her blood pressure is 138/84 and her pulse is 106. Her respiration is 19 and oxygen saturation is 95%.     Chief Complaint: Emesis    Pt comes in with emesis, states her primary care doctor prescribed her a new medication (RYBELSUS) for her blood sugar, states she took it it on an empty stomach and now she's vomiting, pt states sx started today, denies at home tx.     Provider note:    69 F c/o nausea and vomiting today after starting an oral semaglutide (rybelsus) yesterday prescribed by her pcp. She took the medication on an empty stomach as prescribed. She called her pcp and plans to see them after her UC visit. Her last blood glucose was 179 at 130 pm today. Hx of type two diabetes    Emesis   This is a new problem. The current episode started today. The problem occurs 2 to 4 times per day. The problem has been unchanged. There has been no fever. Associated symptoms include chills and sweats. Pertinent negatives include no abdominal pain, arthralgias, chest pain, coughing, decreased urine volume, diarrhea, dizziness, fever, headaches, myalgias, URI or weight loss. She has tried nothing for the symptoms.       Constitution: Positive for chills. Negative for fever.   Cardiovascular:  Negative for chest pain.   Respiratory:  Negative for cough.    Gastrointestinal:  Positive for nausea and vomiting. Negative for abdominal pain, constipation and diarrhea.   Genitourinary:  Negative for urine decreased.   Musculoskeletal:  Negative for joint pain and muscle ache.   Neurological:  Negative for dizziness and headaches.      Objective:     Physical Exam   Constitutional: She is oriented to person, place, and time.   HENT:   Head: Normocephalic and atraumatic.   Eyes: Conjunctivae are normal. Pupils are equal, round, and reactive to " light. Extraocular movement intact   Abdominal: Normal appearance.   Musculoskeletal: Normal range of motion.         General: Normal range of motion.   Neurological: She is alert and oriented to person, place, and time.   Skin: Skin is warm and dry.       Assessment:     1. Medication side effect    2. Nausea        Plan:   Follow up with pcp after visit. D/c semaglutide until she speaks with pcp. Recommended ER if she starts to experience abdominal pain.     Medication side effect    Nausea  -     ondansetron disintegrating tablet 4 mg  -     ondansetron (ZOFRAN-ODT) 4 MG TbDL; Take 1 tablet (4 mg total) by mouth once. for 1 dose  Dispense: 14 tablet; Refill: 0      Please return here or go to the Emergency Department for any concerns or worsening of condition.  Please follow up with your primary care doctor or specialist as needed.    If you  smoke, please stop smoking.

## 2024-08-12 ENCOUNTER — OFFICE VISIT (OUTPATIENT)
Dept: FAMILY MEDICINE | Facility: CLINIC | Age: 69
End: 2024-08-12
Payer: MEDICARE

## 2024-08-12 ENCOUNTER — LAB VISIT (OUTPATIENT)
Dept: LAB | Facility: HOSPITAL | Age: 69
End: 2024-08-12
Attending: FAMILY MEDICINE
Payer: MEDICARE

## 2024-08-12 VITALS
WEIGHT: 154.56 LBS | DIASTOLIC BLOOD PRESSURE: 70 MMHG | BODY MASS INDEX: 29.18 KG/M2 | SYSTOLIC BLOOD PRESSURE: 130 MMHG | HEIGHT: 61 IN | OXYGEN SATURATION: 97 % | HEART RATE: 100 BPM

## 2024-08-12 DIAGNOSIS — K59.04 CHRONIC IDIOPATHIC CONSTIPATION: ICD-10-CM

## 2024-08-12 DIAGNOSIS — E11.22 TYPE 2 DIABETES MELLITUS WITH STAGE 3A CHRONIC KIDNEY DISEASE, WITHOUT LONG-TERM CURRENT USE OF INSULIN: ICD-10-CM

## 2024-08-12 DIAGNOSIS — F51.01 PRIMARY INSOMNIA: ICD-10-CM

## 2024-08-12 DIAGNOSIS — E11.22 TYPE 2 DIABETES MELLITUS WITH STAGE 3A CHRONIC KIDNEY DISEASE, WITHOUT LONG-TERM CURRENT USE OF INSULIN: Primary | ICD-10-CM

## 2024-08-12 DIAGNOSIS — G43.009 MIGRAINE WITHOUT AURA AND WITHOUT STATUS MIGRAINOSUS, NOT INTRACTABLE: ICD-10-CM

## 2024-08-12 DIAGNOSIS — N18.31 CKD STAGE 3A, GFR 45-59 ML/MIN: ICD-10-CM

## 2024-08-12 DIAGNOSIS — Z00.00 HEALTHCARE MAINTENANCE: ICD-10-CM

## 2024-08-12 DIAGNOSIS — I10 PRIMARY HYPERTENSION: ICD-10-CM

## 2024-08-12 DIAGNOSIS — N18.31 TYPE 2 DIABETES MELLITUS WITH STAGE 3A CHRONIC KIDNEY DISEASE, WITHOUT LONG-TERM CURRENT USE OF INSULIN: Primary | ICD-10-CM

## 2024-08-12 DIAGNOSIS — E78.5 HYPERLIPIDEMIA, UNSPECIFIED HYPERLIPIDEMIA TYPE: ICD-10-CM

## 2024-08-12 DIAGNOSIS — N18.31 TYPE 2 DIABETES MELLITUS WITH STAGE 3A CHRONIC KIDNEY DISEASE, WITHOUT LONG-TERM CURRENT USE OF INSULIN: ICD-10-CM

## 2024-08-12 DIAGNOSIS — J30.9 ALLERGIC RHINITIS, UNSPECIFIED SEASONALITY, UNSPECIFIED TRIGGER: ICD-10-CM

## 2024-08-12 LAB
ALBUMIN/CREAT UR: NORMAL UG/MG (ref 0–30)
BACTERIA #/AREA URNS AUTO: ABNORMAL /HPF
BILIRUB UR QL STRIP: NEGATIVE
CLARITY UR REFRACT.AUTO: CLEAR
COLOR UR AUTO: YELLOW
CREAT UR-MCNC: 73 MG/DL (ref 15–325)
GLUCOSE UR QL STRIP: NEGATIVE
HGB UR QL STRIP: NEGATIVE
KETONES UR QL STRIP: NEGATIVE
LEUKOCYTE ESTERASE UR QL STRIP: ABNORMAL
MICROALBUMIN UR DL<=1MG/L-MCNC: <5 UG/ML
MICROSCOPIC COMMENT: ABNORMAL
NITRITE UR QL STRIP: NEGATIVE
NON-SQ EPI CELLS #/AREA URNS AUTO: 1 /HPF
PH UR STRIP: 6 [PH] (ref 5–8)
PROT UR QL STRIP: NEGATIVE
RBC #/AREA URNS AUTO: 1 /HPF (ref 0–4)
SP GR UR STRIP: 1.01 (ref 1–1.03)
SQUAMOUS #/AREA URNS AUTO: 3 /HPF
URN SPEC COLLECT METH UR: ABNORMAL
WBC #/AREA URNS AUTO: 3 /HPF (ref 0–5)

## 2024-08-12 PROCEDURE — 99999 PR PBB SHADOW E&M-EST. PATIENT-LVL IV: CPT | Mod: PBBFAC,,, | Performed by: FAMILY MEDICINE

## 2024-08-12 PROCEDURE — 82570 ASSAY OF URINE CREATININE: CPT | Performed by: FAMILY MEDICINE

## 2024-08-12 PROCEDURE — 81001 URINALYSIS AUTO W/SCOPE: CPT | Performed by: FAMILY MEDICINE

## 2024-08-12 PROCEDURE — 82043 UR ALBUMIN QUANTITATIVE: CPT | Performed by: FAMILY MEDICINE

## 2024-08-12 RX ORDER — ATORVASTATIN CALCIUM 20 MG/1
20 TABLET, FILM COATED ORAL DAILY
Qty: 90 TABLET | Refills: 3 | Status: SHIPPED | OUTPATIENT
Start: 2024-08-12

## 2024-08-12 RX ORDER — NATEGLINIDE 60 MG/1
60 TABLET ORAL
Qty: 270 TABLET | Refills: 3 | Status: SHIPPED | OUTPATIENT
Start: 2024-08-12

## 2024-08-12 RX ORDER — CETIRIZINE HYDROCHLORIDE 10 MG/1
10 TABLET ORAL DAILY
COMMUNITY
Start: 2024-08-12 | End: 2024-08-12 | Stop reason: SDUPTHER

## 2024-08-12 RX ORDER — BLOOD SUGAR DIAGNOSTIC
1 STRIP MISCELLANEOUS 2 TIMES DAILY
COMMUNITY
Start: 2024-07-05

## 2024-08-12 RX ORDER — BUTALBITAL, ACETAMINOPHEN AND CAFFEINE 50; 325; 40 MG/1; MG/1; MG/1
1 TABLET ORAL DAILY PRN
COMMUNITY
Start: 2024-06-20 | End: 2024-08-12 | Stop reason: SDUPTHER

## 2024-08-12 RX ORDER — LINAGLIPTIN 5 MG/1
5 TABLET, FILM COATED ORAL DAILY
Start: 2024-08-12

## 2024-08-12 RX ORDER — BLOOD-GLUCOSE METER
EACH MISCELLANEOUS
COMMUNITY
Start: 2024-02-27

## 2024-08-12 RX ORDER — CETIRIZINE HYDROCHLORIDE 10 MG/1
10 TABLET ORAL DAILY
COMMUNITY
Start: 2024-08-12 | End: 2025-08-12

## 2024-08-12 RX ORDER — HYDROGEN PEROXIDE 3 %
40 SOLUTION, NON-ORAL MISCELLANEOUS
Qty: 180 CAPSULE | Refills: 3 | Status: SHIPPED | OUTPATIENT
Start: 2024-08-12 | End: 2025-08-12

## 2024-08-12 RX ORDER — LINAGLIPTIN 5 MG/1
5 TABLET, FILM COATED ORAL
COMMUNITY
Start: 2024-07-05 | End: 2024-08-12 | Stop reason: SDUPTHER

## 2024-08-12 RX ORDER — POLYETHYLENE GLYCOL 3350 17 G/17G
17 POWDER, FOR SOLUTION ORAL DAILY
COMMUNITY
Start: 2024-08-12

## 2024-08-12 RX ORDER — ATORVASTATIN CALCIUM 20 MG/1
20 TABLET, FILM COATED ORAL
COMMUNITY
Start: 2024-06-13 | End: 2024-08-12 | Stop reason: SDUPTHER

## 2024-08-12 RX ORDER — GLIMEPIRIDE 2 MG/1
2 TABLET ORAL EVERY MORNING
Qty: 90 TABLET | Refills: 3 | Status: SHIPPED | OUTPATIENT
Start: 2024-08-12

## 2024-08-12 RX ORDER — MONTELUKAST SODIUM 10 MG/1
10 TABLET ORAL NIGHTLY
Qty: 90 TABLET | Refills: 3 | Status: SHIPPED | OUTPATIENT
Start: 2024-08-12

## 2024-08-12 RX ORDER — METOPROLOL SUCCINATE 25 MG/1
25 TABLET, EXTENDED RELEASE ORAL DAILY
Qty: 90 TABLET | Refills: 3 | Status: SHIPPED | OUTPATIENT
Start: 2024-08-12

## 2024-08-12 RX ORDER — MONTELUKAST SODIUM 10 MG/1
10 TABLET ORAL
COMMUNITY
Start: 2024-08-05 | End: 2024-08-12 | Stop reason: SDUPTHER

## 2024-08-12 RX ORDER — GLIMEPIRIDE 2 MG/1
2 TABLET ORAL EVERY MORNING
COMMUNITY
Start: 2024-07-18 | End: 2024-08-12 | Stop reason: SDUPTHER

## 2024-08-12 RX ORDER — BUTALBITAL, ACETAMINOPHEN AND CAFFEINE 50; 325; 40 MG/1; MG/1; MG/1
1 TABLET ORAL DAILY PRN
Qty: 30 TABLET | Refills: 5 | Status: SHIPPED | OUTPATIENT
Start: 2024-08-12

## 2024-08-12 RX ORDER — TRIAMCINOLONE ACETONIDE 1 MG/G
CREAM TOPICAL
COMMUNITY
Start: 2024-05-20 | End: 2024-08-13 | Stop reason: ALTCHOICE

## 2024-08-12 RX ORDER — POLYETHYLENE GLYCOL 3350 17 G/17G
17 POWDER, FOR SOLUTION ORAL DAILY
COMMUNITY
Start: 2024-08-12 | End: 2024-08-12 | Stop reason: SDUPTHER

## 2024-08-12 RX ORDER — OMEPRAZOLE 40 MG/1
40 CAPSULE, DELAYED RELEASE ORAL
COMMUNITY
Start: 2024-08-01 | End: 2024-08-12 | Stop reason: SDUPTHER

## 2024-08-12 NOTE — PROGRESS NOTES
Subjective     Patient ID: Kelin Keller is a 69 y.o. female.    Chief Complaint: Establish Care    Pt is here to establish care and for a med refill.  Has DMT2 and takes oral meds.  Trying to comply with diet.  Gets Tradjenta from [employer] due to cost.  Her GERD has not been well controlled on Prilosec and she wants to go back to Nexium.  Has Constipation but no current treatment.  Migraines occur about once per month and relieved with Fioricet.  BP controlled on current meds.  Has AR which is not well controlled.    Review of Systems   Constitutional:  Negative for appetite change, chills, fatigue, fever and unexpected weight change.   HENT:  Positive for nasal congestion, postnasal drip and rhinorrhea. Negative for ear pain, trouble swallowing and voice change.    Respiratory:  Negative for cough, shortness of breath and wheezing.    Cardiovascular:  Negative for chest pain and palpitations.   Gastrointestinal:  Positive for constipation. Negative for abdominal pain, blood in stool, diarrhea, nausea and vomiting.   Endocrine: Negative for cold intolerance and heat intolerance.   Genitourinary:  Negative for dysuria and hematuria.   Musculoskeletal:  Negative for joint swelling, neck stiffness and joint deformity.   Integumentary:  Negative for color change and rash.   Neurological:  Positive for headaches. Negative for seizures, speech difficulty, weakness and coordination difficulties.   Psychiatric/Behavioral:  Negative for confusion, depressed mood and sleep disturbance.           Objective     Physical Exam  Vitals and nursing note reviewed.   Constitutional:       General: She is not in acute distress.     Appearance: Normal appearance. She is not ill-appearing.   HENT:      Head: Normocephalic and atraumatic.      Right Ear: Tympanic membrane, ear canal and external ear normal.      Left Ear: Tympanic membrane, ear canal and external ear normal.      Nose: Nose normal.   Eyes:      General:  No scleral icterus.     Extraocular Movements: Extraocular movements intact.      Conjunctiva/sclera: Conjunctivae normal.      Pupils: Pupils are equal, round, and reactive to light.   Cardiovascular:      Rate and Rhythm: Normal rate and regular rhythm.      Pulses: Normal pulses.      Heart sounds: Normal heart sounds.      No gallop.   Pulmonary:      Effort: Pulmonary effort is normal. No respiratory distress.      Breath sounds: Normal breath sounds. No wheezing or rales.   Abdominal:      General: Bowel sounds are normal. There is no distension.      Palpations: Abdomen is soft. There is no mass.      Tenderness: There is no abdominal tenderness.   Musculoskeletal:         General: No swelling, tenderness or deformity. Normal range of motion.      Cervical back: Normal range of motion and neck supple. No rigidity or tenderness.   Skin:     General: Skin is warm and dry.      Coloration: Skin is not jaundiced.      Findings: No rash.   Neurological:      General: No focal deficit present.      Mental Status: She is alert and oriented to person, place, and time.      Cranial Nerves: No cranial nerve deficit.   Psychiatric:         Mood and Affect: Mood normal.         Behavior: Behavior normal.         Thought Content: Thought content normal.         Judgment: Judgment normal.          Assessment and Plan     1. Type 2 diabetes mellitus with stage 3a chronic kidney disease, without long-term current use of insulin        -     Stable with current meds with A1C <7.  Continue current meds.       -     Teaching on DMT2 including medications, minimizing risk factors, diabetic diet, exercise of at least 150 minutes per week and healthy weight/BMI management with wt loss recommended.        -     CBC Auto Differential; Future; Expected date: 08/12/2024  -     Comprehensive Metabolic Panel; Future; Expected date: 08/12/2024  -     Lipid Panel; Future; Expected date: 08/12/2024  -     TSH; Future; Expected date:  08/12/2024  -     Urinalysis, Reflex to Urine Culture Urine, Clean Catch; Future; Expected date: 08/12/2024  -     HEMOGLOBIN A1C; Future; Expected date: 08/12/2024  -     Microalbumin/creatinine urine ratio; Future; Expected date: 08/12/2024  -     Comprehensive Metabolic Panel; Future; Expected date: 11/12/2024  -     HEMOGLOBIN A1C; Future; Expected date: 11/12/2024  -     CBC Auto Differential; Future; Expected date: 11/12/2024    2. Primary hypertension        -    Well controlled with current medication of Metoprolol.  Continue meds.       -    Teaching on HTN including minimizing risk factors, low sodium diet, avoid salty foods such as processed meats/frozen meals/fast foods, exercise of at least 150 minutes per week and healthy weight/BMI     management with wt loss recommended.      3. Hyperlipidemia, unspecified hyperlipidemia type      -   Continue Lipitor.  Teaching on medication, diet, exercise and wt management.    4. Allergic rhinitis, unspecified seasonality, unspecified trigger      -   Not well controlled so continue Singular and start Zyrtec daily.  Add Flonase and/or saline sinus rinses if needed.  Teaching on AR including avoiding triggers.    5. Migraine without aura and without status migrainosus, not intractable        -    Teaching on Migraines including avoiding triggers, treatment options and warning symptoms.  Continue Fioricet as needed as good response to medication.  6. CKD stage 3a, GFR 45-59 ml/min        -     Minimize risk factors including avoiding NSAIDS, controlling DMT2 and controlling HTN.  Labs pending.  -     Comprehensive Metabolic Panel; Future; Expected date: 08/12/2024  -     Microalbumin/creatinine urine ratio; Future; Expected date: 08/12/2024    7. Chronic idiopathic constipation  -    Teaching on Constipation including diet changes and treatment options.  Restart Miralax daily and titrate dose as needed.  -     polyethylene glycol (GLYCOLAX) 17 gram/dose powder; Take  17 g by mouth once daily.    8. Primary insomnia       -    Needs to practice good sleep hygiene which she currently is not doing.  Will consider meds if needed.    9. Healthcare maintenance  -     HEPATITIS C ANTIBODY; Future; Expected date: 08/12/2024    Other orders  -     atorvastatin (LIPITOR) 20 MG tablet; Take 1 tablet (20 mg total) by mouth once daily.  Dispense: 90 tablet; Refill: 3  -     butalbital-acetaminophen-caffeine -40 mg (FIORICET, ESGIC) -40 mg per tablet; Take 1 tablet by mouth daily as needed for Headaches.  Dispense: 30 tablet; Refill: 5  -     cetirizine (ZYRTEC) 10 MG tablet; Take 1 tablet (10 mg total) by mouth once daily.  -     esomeprazole (NEXIUM 24HR) 20 MG capsule; Take 2 capsules (40 mg total) by mouth before breakfast.  Dispense: 180 capsule; Refill: 3  -     metoprolol succinate (TOPROL-XL) 25 MG 24 hr tablet; Take 1 tablet (25 mg total) by mouth once daily.  Dispense: 90 tablet; Refill: 3  -     glimepiride (AMARYL) 2 MG tablet; Take 1 tablet (2 mg total) by mouth every morning.  Dispense: 90 tablet; Refill: 3  -     montelukast (SINGULAIR) 10 mg tablet; Take 1 tablet (10 mg total) by mouth every evening.  Dispense: 90 tablet; Refill: 3  -     nateglinide (STARLIX) 60 MG tablet; Take 1 tablet (60 mg total) by mouth 3 (three) times daily before meals.  Dispense: 270 tablet; Refill: 3  -     TRADJENTA 5 mg Tab tablet; Take 1 tablet (5 mg total) by mouth once daily.             Follow up in about 3 months (around 11/12/2024) for DMT2 and HTN with labs 1-2 weeks before visit.

## 2024-08-15 ENCOUNTER — PATIENT OUTREACH (OUTPATIENT)
Dept: ADMINISTRATIVE | Facility: HOSPITAL | Age: 69
End: 2024-08-15
Payer: MEDICARE

## 2024-08-15 NOTE — PROGRESS NOTES
Population Health Chart Review & Patient Outreach Details      Additional [facility] Health Notes:    External EGD report uploaded to media.           Updates Requested / Reviewed:      Updated Care Coordination Note, Care Everywhere, , and Immunizations Reconciliation Completed or Queried: [facility] Topics Overdue:      [facility] Score: 1     Osteoporosis Screening    Pneumonia Vaccine  RSV Vaccine                  Health Maintenance Topic(s) Outreach Outcomes & Actions Taken:

## 2024-09-09 DIAGNOSIS — G43.009 MIGRAINE WITHOUT AURA AND WITHOUT STATUS MIGRAINOSUS, NOT INTRACTABLE: Primary | ICD-10-CM

## 2024-09-09 RX ORDER — BUTALBITAL, ACETAMINOPHEN AND CAFFEINE 50; 325; 40 MG/1; MG/1; MG/1
1 TABLET ORAL DAILY PRN
Qty: 30 TABLET | Refills: 5 | Status: SHIPPED | OUTPATIENT
Start: 2024-09-09

## 2024-09-18 ENCOUNTER — OFFICE VISIT (OUTPATIENT)
Dept: FAMILY MEDICINE | Facility: CLINIC | Age: 69
End: 2024-09-18
Payer: MEDICARE

## 2024-09-18 VITALS
TEMPERATURE: 98 F | HEIGHT: 61 IN | SYSTOLIC BLOOD PRESSURE: 120 MMHG | WEIGHT: 153.25 LBS | OXYGEN SATURATION: 97 % | DIASTOLIC BLOOD PRESSURE: 72 MMHG | BODY MASS INDEX: 28.93 KG/M2 | HEART RATE: 97 BPM

## 2024-09-18 DIAGNOSIS — N39.0 URINARY TRACT INFECTION WITH HEMATURIA, SITE UNSPECIFIED: ICD-10-CM

## 2024-09-18 DIAGNOSIS — R31.9 URINARY TRACT INFECTION WITH HEMATURIA, SITE UNSPECIFIED: ICD-10-CM

## 2024-09-18 DIAGNOSIS — K21.9 GASTROESOPHAGEAL REFLUX DISEASE WITHOUT ESOPHAGITIS: Primary | ICD-10-CM

## 2024-09-18 LAB
BILIRUB SERPL-MCNC: NEGATIVE MG/DL
BLOOD URINE, POC: NORMAL
COLOR, POC UA: COLORLESS
GLUCOSE UR QL STRIP: NEGATIVE
KETONES UR QL STRIP: NEGATIVE
LEUKOCYTE ESTERASE URINE, POC: NEGATIVE
NITRITE, POC UA: NEGATIVE
PH, POC UA: 6
PROTEIN, POC: NEGATIVE
SPECIFIC GRAVITY, POC UA: 1
UROBILINOGEN, POC UA: 0.2

## 2024-09-18 PROCEDURE — 3078F DIAST BP <80 MM HG: CPT | Mod: CPTII,S$GLB,, | Performed by: FAMILY MEDICINE

## 2024-09-18 PROCEDURE — 81001 URINALYSIS AUTO W/SCOPE: CPT | Performed by: FAMILY MEDICINE

## 2024-09-18 PROCEDURE — 3008F BODY MASS INDEX DOCD: CPT | Mod: CPTII,S$GLB,, | Performed by: FAMILY MEDICINE

## 2024-09-18 PROCEDURE — 87086 URINE CULTURE/COLONY COUNT: CPT | Performed by: FAMILY MEDICINE

## 2024-09-18 PROCEDURE — 87186 SC STD MICRODIL/AGAR DIL: CPT | Performed by: FAMILY MEDICINE

## 2024-09-18 PROCEDURE — 81001 URINALYSIS AUTO W/SCOPE: CPT | Mod: S$GLB,,, | Performed by: FAMILY MEDICINE

## 2024-09-18 PROCEDURE — 1101F PT FALLS ASSESS-DOCD LE1/YR: CPT | Mod: CPTII,S$GLB,, | Performed by: FAMILY MEDICINE

## 2024-09-18 PROCEDURE — 3288F FALL RISK ASSESSMENT DOCD: CPT | Mod: CPTII,S$GLB,, | Performed by: FAMILY MEDICINE

## 2024-09-18 PROCEDURE — 3052F HG A1C>EQUAL 8.0%<EQUAL 9.0%: CPT | Mod: CPTII,S$GLB,, | Performed by: FAMILY MEDICINE

## 2024-09-18 PROCEDURE — 99999 PR PBB SHADOW E&M-EST. PATIENT-LVL III: CPT | Mod: PBBFAC,,, | Performed by: FAMILY MEDICINE

## 2024-09-18 PROCEDURE — 87088 URINE BACTERIA CULTURE: CPT | Performed by: FAMILY MEDICINE

## 2024-09-18 PROCEDURE — 1125F AMNT PAIN NOTED PAIN PRSNT: CPT | Mod: CPTII,S$GLB,, | Performed by: FAMILY MEDICINE

## 2024-09-18 PROCEDURE — 3066F NEPHROPATHY DOC TX: CPT | Mod: CPTII,S$GLB,, | Performed by: FAMILY MEDICINE

## 2024-09-18 PROCEDURE — 3061F NEG MICROALBUMINURIA REV: CPT | Mod: CPTII,S$GLB,, | Performed by: FAMILY MEDICINE

## 2024-09-18 PROCEDURE — 3074F SYST BP LT 130 MM HG: CPT | Mod: CPTII,S$GLB,, | Performed by: FAMILY MEDICINE

## 2024-09-18 PROCEDURE — 1159F MED LIST DOCD IN RCRD: CPT | Mod: CPTII,S$GLB,, | Performed by: FAMILY MEDICINE

## 2024-09-18 PROCEDURE — 99213 OFFICE O/P EST LOW 20 MIN: CPT | Mod: S$GLB,,, | Performed by: FAMILY MEDICINE

## 2024-09-18 RX ORDER — FLUTICASONE PROPIONATE 50 MCG
1 SPRAY, SUSPENSION (ML) NASAL 2 TIMES DAILY
COMMUNITY
Start: 2024-08-14

## 2024-09-18 RX ORDER — SULFAMETHOXAZOLE AND TRIMETHOPRIM 800; 160 MG/1; MG/1
1 TABLET ORAL 2 TIMES DAILY
Qty: 10 TABLET | Refills: 0 | Status: SHIPPED | OUTPATIENT
Start: 2024-09-18 | End: 2024-09-22 | Stop reason: ALTCHOICE

## 2024-09-18 RX ORDER — OMEPRAZOLE 40 MG/1
40 CAPSULE, DELAYED RELEASE ORAL DAILY
Qty: 90 CAPSULE | Refills: 3 | Status: SHIPPED | OUTPATIENT
Start: 2024-09-18 | End: 2025-09-18

## 2024-09-18 RX ORDER — BUTALBITAL, ACETAMINOPHEN, CAFFEINE AND CODEINE PHOSPHATE 50; 325; 40; 30 MG/1; MG/1; MG/1; MG/1
CAPSULE ORAL DAILY PRN
COMMUNITY
Start: 2024-05-20

## 2024-09-18 NOTE — PROGRESS NOTES
Pt is here for Gross Hematuria starting yesterday.  She has never had Hematuria in the past. Was also having lower abdominal pain but urgency or frequency.  Denies a fever, N/V, confusion, SOB or CP.  She did feel warm with chills and dizziness.     Has chronic Constipation but denies any hx of hemorrhoids.     Answers submitted by the patient for this visit:  Fever Questionnaire (Submitted on 9/18/2024)  Chief Complaint: Fever  Chronicity: new  Onset: today  Frequency: rarely  Max temp prior to arrival: 99 to 99.9 F  Temperature source: an oral thermometer  headaches: Yes  urinary pain: Yes  Treatments tried: acetaminophen  Improvement on treatment: mild

## 2024-09-19 DIAGNOSIS — Z78.0 MENOPAUSE: ICD-10-CM

## 2024-09-19 LAB
BACTERIA #/AREA URNS AUTO: ABNORMAL /HPF
BILIRUB UR QL STRIP: NEGATIVE
CAOX CRY UR QL COMP ASSIST: ABNORMAL
CLARITY UR REFRACT.AUTO: CLEAR
COLOR UR AUTO: YELLOW
GLUCOSE UR QL STRIP: NEGATIVE
HGB UR QL STRIP: ABNORMAL
KETONES UR QL STRIP: NEGATIVE
LEUKOCYTE ESTERASE UR QL STRIP: ABNORMAL
MICROSCOPIC COMMENT: ABNORMAL
NITRITE UR QL STRIP: NEGATIVE
NON-SQ EPI CELLS #/AREA URNS AUTO: 0 /HPF
PH UR STRIP: 6 [PH] (ref 5–8)
PROT UR QL STRIP: NEGATIVE
RBC #/AREA URNS AUTO: 2 /HPF (ref 0–4)
SP GR UR STRIP: 1.01 (ref 1–1.03)
SQUAMOUS #/AREA URNS AUTO: 4 /HPF
URN SPEC COLLECT METH UR: ABNORMAL
WBC #/AREA URNS AUTO: 27 /HPF (ref 0–5)
WBC CLUMPS UR QL AUTO: ABNORMAL
YEAST UR QL AUTO: ABNORMAL

## 2024-09-22 DIAGNOSIS — R31.9 URINARY TRACT INFECTION WITH HEMATURIA, SITE UNSPECIFIED: Primary | ICD-10-CM

## 2024-09-22 DIAGNOSIS — N39.0 URINARY TRACT INFECTION WITH HEMATURIA, SITE UNSPECIFIED: Primary | ICD-10-CM

## 2024-09-22 LAB — BACTERIA UR CULT: ABNORMAL

## 2024-09-22 RX ORDER — NITROFURANTOIN 25; 75 MG/1; MG/1
100 CAPSULE ORAL 2 TIMES DAILY
Qty: 14 CAPSULE | Refills: 0 | Status: SHIPPED | OUTPATIENT
Start: 2024-09-22 | End: 2024-09-29

## 2024-09-22 NOTE — PROGRESS NOTES
Subjective     Patient ID: Kelin Keller is a 69 y.o. female.    Chief Complaint: Fever (l)    Pt is here for Gross Hematuria starting yesterday.  She has never had Hematuria in the past. Was also having lower abdominal pain but no other urinary symptoms.  Denies urinary urgency or frequency.  Denies a fever, N/V, confusion, SOB or CP.  She did feel warm with chills and dizziness.     Has chronic Constipation but denies any hx of hemorrhoids.     Review of Systems   HENT:  Negative for trouble swallowing and voice change.    Respiratory:  Negative for shortness of breath.    Gastrointestinal:  Positive for reflux.   Musculoskeletal:  Negative for joint deformity.   Psychiatric/Behavioral:  Negative for depressed mood.       Objective   Physical Exam  Vitals and nursing note reviewed.   Constitutional:       General: She is not in acute distress.     Appearance: Normal appearance. She is not ill-appearing.   HENT:      Head: Normocephalic and atraumatic.      Right Ear: Tympanic membrane, ear canal and external ear normal.      Left Ear: Tympanic membrane, ear canal and external ear normal.      Nose: Nose normal.   Eyes:      General: No scleral icterus.     Extraocular Movements: Extraocular movements intact.      Conjunctiva/sclera: Conjunctivae normal.      Pupils: Pupils are equal, round, and reactive to light.   Cardiovascular:      Rate and Rhythm: Regular rhythm.      Pulses: Normal pulses.      Heart sounds: Normal heart sounds.      No gallop.   Pulmonary:      Effort: Pulmonary effort is normal. No respiratory distress.      Breath sounds: Normal breath sounds. No wheezing or rales.   Abdominal:      General: Bowel sounds are normal. There is no distension.      Palpations: Abdomen is soft. There is no mass.      Tenderness: There is no abdominal tenderness.   Musculoskeletal:         General: No swelling, tenderness or deformity. Normal range of motion.      Cervical back: Normal range of motion and  neck supple. No rigidity or tenderness.   Skin:     General: Skin is warm and dry.      Coloration: Skin is not jaundiced.      Findings: No rash.   Neurological:      General: No focal deficit present.      Mental Status: She is alert and oriented to person, place, and time.      Cranial Nerves: No cranial nerve deficit.   Psychiatric:         Mood and Affect: Mood normal.         Behavior: Behavior normal.         Thought Content: Thought content normal.         Judgment: Judgment normal.      Assessment and Plan     1. Gastroesophageal reflux disease without esophagitis  - Teaching reviewed on GERD including GERD precautions, diet changes, prevention, and treatment options.  Start Prilosec daily.  -     omeprazole (PRILOSEC) 40 MG capsule; Take 1 capsule (40 mg total) by mouth once daily.  Dispense: 90 capsule; Refill: 3    2. UTI with Hematuria  - Teaching on UTI including prevention, treatment options and warning symptoms.  Will treat UTI and then repeat UA in 3-4 weeks to check for clearance of infection.  -     POCT URINE DIPSTICK WITH MICROSCOPE, AUTOMATED  -     Urinalysis, Reflex to Urine Culture Urine, Clean Catch  -     sulfamethoxazole-trimethoprim 800-160mg (BACTRIM DS) 800-160 mg Tab; Take 1 tablet by mouth 2 (two) times daily. for 5 days  Dispense: 10 tablet; Refill: 0    Follow up for repeat UA 3-4 weeks to check for clearance

## 2024-09-24 ENCOUNTER — TELEPHONE (OUTPATIENT)
Dept: FAMILY MEDICINE | Facility: CLINIC | Age: 69
End: 2024-09-24
Payer: MEDICARE

## 2024-09-24 NOTE — TELEPHONE ENCOUNTER
----- Message from Sara Young MD sent at 9/22/2024  1:14 PM CDT -----  Please call pt and be sure she picks up the new antibiotic for her UTI as cultures showed she needed a new antibiotic which I sent Sunday.  Thank you.

## 2024-10-14 ENCOUNTER — LAB VISIT (OUTPATIENT)
Dept: LAB | Facility: HOSPITAL | Age: 69
End: 2024-10-14
Attending: FAMILY MEDICINE
Payer: MEDICARE

## 2024-10-14 ENCOUNTER — OFFICE VISIT (OUTPATIENT)
Dept: FAMILY MEDICINE | Facility: CLINIC | Age: 69
End: 2024-10-14
Payer: MEDICARE

## 2024-10-14 VITALS
DIASTOLIC BLOOD PRESSURE: 70 MMHG | HEIGHT: 61 IN | SYSTOLIC BLOOD PRESSURE: 120 MMHG | HEART RATE: 100 BPM | BODY MASS INDEX: 28.51 KG/M2 | WEIGHT: 151 LBS | OXYGEN SATURATION: 98 %

## 2024-10-14 DIAGNOSIS — M54.50 CHRONIC BILATERAL LOW BACK PAIN WITHOUT SCIATICA: ICD-10-CM

## 2024-10-14 DIAGNOSIS — N39.0 URINARY TRACT INFECTION WITH HEMATURIA, SITE UNSPECIFIED: ICD-10-CM

## 2024-10-14 DIAGNOSIS — R31.9 URINARY TRACT INFECTION WITH HEMATURIA, SITE UNSPECIFIED: ICD-10-CM

## 2024-10-14 DIAGNOSIS — G89.29 CHRONIC BILATERAL LOW BACK PAIN WITHOUT SCIATICA: ICD-10-CM

## 2024-10-14 DIAGNOSIS — G43.009 MIGRAINE WITHOUT AURA AND WITHOUT STATUS MIGRAINOSUS, NOT INTRACTABLE: Primary | ICD-10-CM

## 2024-10-14 DIAGNOSIS — Z23 NEEDS FLU SHOT: ICD-10-CM

## 2024-10-14 LAB
BILIRUB UR QL STRIP: NEGATIVE
CLARITY UR REFRACT.AUTO: CLEAR
COLOR UR AUTO: YELLOW
GLUCOSE UR QL STRIP: ABNORMAL
HGB UR QL STRIP: NEGATIVE
KETONES UR QL STRIP: NEGATIVE
LEUKOCYTE ESTERASE UR QL STRIP: NEGATIVE
NITRITE UR QL STRIP: NEGATIVE
PH UR STRIP: 6 [PH] (ref 5–8)
PROT UR QL STRIP: NEGATIVE
SP GR UR STRIP: 1.02 (ref 1–1.03)
URN SPEC COLLECT METH UR: ABNORMAL

## 2024-10-14 PROCEDURE — 1159F MED LIST DOCD IN RCRD: CPT | Mod: CPTII,S$GLB,, | Performed by: FAMILY MEDICINE

## 2024-10-14 PROCEDURE — 3008F BODY MASS INDEX DOCD: CPT | Mod: CPTII,S$GLB,, | Performed by: FAMILY MEDICINE

## 2024-10-14 PROCEDURE — 3078F DIAST BP <80 MM HG: CPT | Mod: CPTII,S$GLB,, | Performed by: FAMILY MEDICINE

## 2024-10-14 PROCEDURE — 3052F HG A1C>EQUAL 8.0%<EQUAL 9.0%: CPT | Mod: CPTII,S$GLB,, | Performed by: FAMILY MEDICINE

## 2024-10-14 PROCEDURE — 3066F NEPHROPATHY DOC TX: CPT | Mod: CPTII,S$GLB,, | Performed by: FAMILY MEDICINE

## 2024-10-14 PROCEDURE — 90653 IIV ADJUVANT VACCINE IM: CPT | Mod: S$GLB,,, | Performed by: FAMILY MEDICINE

## 2024-10-14 PROCEDURE — 1160F RVW MEDS BY RX/DR IN RCRD: CPT | Mod: CPTII,S$GLB,, | Performed by: FAMILY MEDICINE

## 2024-10-14 PROCEDURE — 3061F NEG MICROALBUMINURIA REV: CPT | Mod: CPTII,S$GLB,, | Performed by: FAMILY MEDICINE

## 2024-10-14 PROCEDURE — 3074F SYST BP LT 130 MM HG: CPT | Mod: CPTII,S$GLB,, | Performed by: FAMILY MEDICINE

## 2024-10-14 PROCEDURE — G0008 ADMIN INFLUENZA VIRUS VAC: HCPCS | Mod: S$GLB,,, | Performed by: FAMILY MEDICINE

## 2024-10-14 PROCEDURE — 1125F AMNT PAIN NOTED PAIN PRSNT: CPT | Mod: CPTII,S$GLB,, | Performed by: FAMILY MEDICINE

## 2024-10-14 PROCEDURE — 99214 OFFICE O/P EST MOD 30 MIN: CPT | Mod: S$GLB,,, | Performed by: FAMILY MEDICINE

## 2024-10-14 PROCEDURE — 81003 URINALYSIS AUTO W/O SCOPE: CPT | Performed by: FAMILY MEDICINE

## 2024-10-14 PROCEDURE — 3288F FALL RISK ASSESSMENT DOCD: CPT | Mod: CPTII,S$GLB,, | Performed by: FAMILY MEDICINE

## 2024-10-14 PROCEDURE — 1101F PT FALLS ASSESS-DOCD LE1/YR: CPT | Mod: CPTII,S$GLB,, | Performed by: FAMILY MEDICINE

## 2024-10-14 PROCEDURE — 99999 PR PBB SHADOW E&M-EST. PATIENT-LVL IV: CPT | Mod: PBBFAC,,, | Performed by: FAMILY MEDICINE

## 2024-10-14 RX ORDER — BUTALBITAL, ACETAMINOPHEN AND CAFFEINE 50; 325; 40 MG/1; MG/1; MG/1
1 TABLET ORAL DAILY PRN
Qty: 30 TABLET | Refills: 5 | Status: SHIPPED | OUTPATIENT
Start: 2024-10-14

## 2024-10-14 NOTE — PROGRESS NOTES
Subjective     Patient ID: Kelin Keller is a 69 y.o. female.    Chief Complaint: No chief complaint on file.    Pt is here for follow up on her Migraines.    Her HA's are intermittent and occurring about 2 times per month, rated 8/10 at worst, located left side of head and relieved by Fioricet or Tylenol.  Denies any red flag symptoms.    She is c/o chronic intermittent LBP for over 10 years.  She gets intermittent flare ups.  She started having an acute flare up about 1 week ago.  She had Xrays in the past at [facility].  She had used NSAID's in the past but now has CKD.  She has used rest, heating pad, Tylenol, Diclofenac Gel and Lidocaine Patches with some relief.  Doesn't want muscle relaxers due to sedation.    She also has UTI with hematuria recently and needs repeat UA to check for cure and resolution of hematuria.    Review of Systems   Constitutional:  Negative for appetite change, chills, fatigue, fever and unexpected weight change.   HENT:  Negative for ear pain, trouble swallowing and voice change.    Respiratory:  Negative for cough, shortness of breath and wheezing.    Cardiovascular:  Negative for chest pain and palpitations.   Gastrointestinal:  Negative for abdominal pain, blood in stool, constipation, diarrhea, nausea and vomiting.   Endocrine: Negative for cold intolerance and heat intolerance.   Genitourinary:  Positive for hematuria. Negative for dysuria.   Musculoskeletal:  Positive for back pain (see hpi). Negative for joint swelling, neck stiffness and joint deformity.   Integumentary:  Negative for color change and rash.   Neurological:  Positive for headaches (Migraines - see hpi). Negative for seizures, speech difficulty, weakness and coordination difficulties.   Psychiatric/Behavioral:  Negative for confusion, depressed mood and sleep disturbance. The patient is not nervous/anxious.       Objective   Physical Exam  Vitals and nursing note reviewed.   Constitutional:       Appearance: Normal  appearance. She is obese.   HENT:      Head: Normocephalic and atraumatic.      Mouth/Throat:      Mouth: Mucous membranes are moist.      Pharynx: Oropharynx is clear.   Eyes:      Extraocular Movements: Extraocular movements intact.      Conjunctiva/sclera: Conjunctivae normal.      Pupils: Pupils are equal, round, and reactive to light.   Cardiovascular:      Rate and Rhythm: Normal rate and regular rhythm.      Pulses: Normal pulses.      Heart sounds: Normal heart sounds.   Pulmonary:      Effort: Pulmonary effort is normal.      Breath sounds: Normal breath sounds.   Abdominal:      General: Bowel sounds are normal.      Palpations: Abdomen is soft.   Musculoskeletal:         General: Tenderness (mild tenderness to palp lumbar paraspinal muscles) present. Normal range of motion.      Cervical back: Normal range of motion and neck supple.   Skin:     General: Skin is warm and dry.   Neurological:      General: No focal deficit present.      Mental Status: She is alert.   Psychiatric:         Mood and Affect: Mood normal.         Behavior: Behavior normal.         Thought Content: Thought content normal.         Judgment: Judgment normal.       Assessment and Plan     1. Migraine without aura and without status migrainosus, not intractable  - Teaching on Migraines including avoiding triggers, treatment options and warning symptoms.  Continue Tylenol and Fioricet as needed.  -     butalbital-acetaminophen-caffeine -40 mg (FIORICET, ESGIC) -40 mg per tablet; Take 1 tablet by mouth daily as needed for Headaches.  Dispense: 30 tablet; Refill: 5    2. Chronic bilateral low back pain without sciatica        - Conservative treatment including rest, ice/heat, message, exercise, compression, topical creams, and Tylenol.  No NSAIDS due CKD.     3. Urinary tract infection with hematuria, site unspecified        - Teaching reviewed on UTI with hematuria including prevention, treatment and warning symptoms.  Here  for repeat testing to check for cure and resolution of hematuria.    4. Needs flu shot  -     influenza (adjuvanted) (Fluad) 45 mcg/0.5 mL IM vaccine (> or = 64 yo) 0.5 mL    Follow up in 3 weeks as scheduled for chronic illnesses

## 2024-10-21 ENCOUNTER — PATIENT MESSAGE (OUTPATIENT)
Dept: ADMINISTRATIVE | Facility: HOSPITAL | Age: 69
End: 2024-10-21
Payer: MEDICARE

## 2024-10-31 ENCOUNTER — PATIENT MESSAGE (OUTPATIENT)
Dept: ADMINISTRATIVE | Facility: HOSPITAL | Age: 69
End: 2024-10-31
Payer: MEDICARE

## 2024-11-05 ENCOUNTER — LAB VISIT (OUTPATIENT)
Dept: LAB | Facility: HOSPITAL | Age: 69
End: 2024-11-05
Attending: FAMILY MEDICINE
Payer: MEDICARE

## 2024-11-05 DIAGNOSIS — N18.31 TYPE 2 DIABETES MELLITUS WITH STAGE 3A CHRONIC KIDNEY DISEASE, WITHOUT LONG-TERM CURRENT USE OF INSULIN: ICD-10-CM

## 2024-11-05 DIAGNOSIS — E11.22 TYPE 2 DIABETES MELLITUS WITH STAGE 3A CHRONIC KIDNEY DISEASE, WITHOUT LONG-TERM CURRENT USE OF INSULIN: ICD-10-CM

## 2024-11-05 LAB
ALBUMIN SERPL BCP-MCNC: 3.9 G/DL (ref 3.5–5.2)
ALP SERPL-CCNC: 151 U/L (ref 40–150)
ALT SERPL W/O P-5'-P-CCNC: 23 U/L (ref 10–44)
ANION GAP SERPL CALC-SCNC: 11 MMOL/L (ref 8–16)
AST SERPL-CCNC: 24 U/L (ref 10–40)
BASOPHILS # BLD AUTO: 0.07 K/UL (ref 0–0.2)
BASOPHILS NFR BLD: 0.7 % (ref 0–1.9)
BILIRUB SERPL-MCNC: 0.6 MG/DL (ref 0.1–1)
BUN SERPL-MCNC: 15 MG/DL (ref 8–23)
CALCIUM SERPL-MCNC: 9.4 MG/DL (ref 8.7–10.5)
CHLORIDE SERPL-SCNC: 102 MMOL/L (ref 95–110)
CO2 SERPL-SCNC: 23 MMOL/L (ref 23–29)
CREAT SERPL-MCNC: 1.2 MG/DL (ref 0.5–1.4)
DIFFERENTIAL METHOD BLD: ABNORMAL
EOSINOPHIL # BLD AUTO: 0.2 K/UL (ref 0–0.5)
EOSINOPHIL NFR BLD: 2.2 % (ref 0–8)
ERYTHROCYTE [DISTWIDTH] IN BLOOD BY AUTOMATED COUNT: 12.4 % (ref 11.5–14.5)
EST. GFR  (NO RACE VARIABLE): 48.7 ML/MIN/1.73 M^2
ESTIMATED AVG GLUCOSE: 212 MG/DL (ref 68–131)
GLUCOSE SERPL-MCNC: 260 MG/DL (ref 70–110)
HBA1C MFR BLD: 9 % (ref 4–5.6)
HCT VFR BLD AUTO: 37.4 % (ref 37–48.5)
HGB BLD-MCNC: 12.8 G/DL (ref 12–16)
IMM GRANULOCYTES # BLD AUTO: 0.03 K/UL (ref 0–0.04)
IMM GRANULOCYTES NFR BLD AUTO: 0.3 % (ref 0–0.5)
LYMPHOCYTES # BLD AUTO: 2.2 K/UL (ref 1–4.8)
LYMPHOCYTES NFR BLD: 23 % (ref 18–48)
MCH RBC QN AUTO: 32.8 PG (ref 27–31)
MCHC RBC AUTO-ENTMCNC: 34.2 G/DL (ref 32–36)
MCV RBC AUTO: 96 FL (ref 82–98)
MONOCYTES # BLD AUTO: 0.8 K/UL (ref 0.3–1)
MONOCYTES NFR BLD: 8.3 % (ref 4–15)
NEUTROPHILS # BLD AUTO: 6.2 K/UL (ref 1.8–7.7)
NEUTROPHILS NFR BLD: 65.5 % (ref 38–73)
NRBC BLD-RTO: 0 /100 WBC
PLATELET # BLD AUTO: 193 K/UL (ref 150–450)
PMV BLD AUTO: 10.6 FL (ref 9.2–12.9)
POTASSIUM SERPL-SCNC: 4.7 MMOL/L (ref 3.5–5.1)
PROT SERPL-MCNC: 7.9 G/DL (ref 6–8.4)
RBC # BLD AUTO: 3.9 M/UL (ref 4–5.4)
SODIUM SERPL-SCNC: 136 MMOL/L (ref 136–145)
WBC # BLD AUTO: 9.44 K/UL (ref 3.9–12.7)

## 2024-11-05 PROCEDURE — 83036 HEMOGLOBIN GLYCOSYLATED A1C: CPT | Performed by: FAMILY MEDICINE

## 2024-11-05 PROCEDURE — 36415 COLL VENOUS BLD VENIPUNCTURE: CPT | Mod: PO | Performed by: FAMILY MEDICINE

## 2024-11-05 PROCEDURE — 85025 COMPLETE CBC W/AUTO DIFF WBC: CPT | Performed by: FAMILY MEDICINE

## 2024-11-05 PROCEDURE — 80053 COMPREHEN METABOLIC PANEL: CPT | Performed by: FAMILY MEDICINE

## 2024-11-08 ENCOUNTER — PATIENT OUTREACH (OUTPATIENT)
Dept: ADMINISTRATIVE | Facility: HOSPITAL | Age: 69
End: 2024-11-08
Payer: MEDICARE

## 2024-11-08 NOTE — PROGRESS NOTES
Health Maintenance Topic(s) Outreach Outcomes & Actions Taken:    Breast Cancer Screening - Outreach Outcomes & Actions Taken  : External Records Uploaded & Care Team Updated if Applicable

## 2024-11-12 ENCOUNTER — PATIENT OUTREACH (OUTPATIENT)
Dept: ADMINISTRATIVE | Facility: HOSPITAL | Age: 69
End: 2024-11-12
Payer: MEDICARE

## 2024-11-12 ENCOUNTER — OFFICE VISIT (OUTPATIENT)
Dept: FAMILY MEDICINE | Facility: CLINIC | Age: 69
End: 2024-11-12
Payer: MEDICARE

## 2024-11-12 VITALS
SYSTOLIC BLOOD PRESSURE: 140 MMHG | WEIGHT: 152.13 LBS | HEART RATE: 101 BPM | HEIGHT: 61 IN | OXYGEN SATURATION: 97 % | DIASTOLIC BLOOD PRESSURE: 82 MMHG | BODY MASS INDEX: 28.72 KG/M2

## 2024-11-12 DIAGNOSIS — M54.50 CHRONIC BILATERAL LOW BACK PAIN WITHOUT SCIATICA: ICD-10-CM

## 2024-11-12 DIAGNOSIS — G89.29 CHRONIC BILATERAL LOW BACK PAIN WITHOUT SCIATICA: ICD-10-CM

## 2024-11-12 DIAGNOSIS — I10 PRIMARY HYPERTENSION: ICD-10-CM

## 2024-11-12 DIAGNOSIS — N18.31 CKD STAGE 3A, GFR 45-59 ML/MIN: ICD-10-CM

## 2024-11-12 DIAGNOSIS — E11.22 TYPE 2 DIABETES MELLITUS WITH STAGE 3A CHRONIC KIDNEY DISEASE, WITHOUT LONG-TERM CURRENT USE OF INSULIN: Primary | ICD-10-CM

## 2024-11-12 DIAGNOSIS — J30.9 ALLERGIC RHINITIS, UNSPECIFIED SEASONALITY, UNSPECIFIED TRIGGER: ICD-10-CM

## 2024-11-12 DIAGNOSIS — N18.31 TYPE 2 DIABETES MELLITUS WITH STAGE 3A CHRONIC KIDNEY DISEASE, WITHOUT LONG-TERM CURRENT USE OF INSULIN: Primary | ICD-10-CM

## 2024-11-12 DIAGNOSIS — K57.30 DIVERTICULOSIS OF COLON: ICD-10-CM

## 2024-11-12 DIAGNOSIS — E78.49 OTHER HYPERLIPIDEMIA: ICD-10-CM

## 2024-11-12 PROCEDURE — 99215 OFFICE O/P EST HI 40 MIN: CPT | Mod: S$GLB,,, | Performed by: FAMILY MEDICINE

## 2024-11-12 PROCEDURE — 3066F NEPHROPATHY DOC TX: CPT | Mod: CPTII,S$GLB,, | Performed by: FAMILY MEDICINE

## 2024-11-12 PROCEDURE — 3052F HG A1C>EQUAL 8.0%<EQUAL 9.0%: CPT | Mod: CPTII,S$GLB,, | Performed by: FAMILY MEDICINE

## 2024-11-12 PROCEDURE — 3288F FALL RISK ASSESSMENT DOCD: CPT | Mod: CPTII,S$GLB,, | Performed by: FAMILY MEDICINE

## 2024-11-12 PROCEDURE — 1101F PT FALLS ASSESS-DOCD LE1/YR: CPT | Mod: CPTII,S$GLB,, | Performed by: FAMILY MEDICINE

## 2024-11-12 PROCEDURE — 3079F DIAST BP 80-89 MM HG: CPT | Mod: CPTII,S$GLB,, | Performed by: FAMILY MEDICINE

## 2024-11-12 PROCEDURE — 1159F MED LIST DOCD IN RCRD: CPT | Mod: CPTII,S$GLB,, | Performed by: FAMILY MEDICINE

## 2024-11-12 PROCEDURE — 3077F SYST BP >= 140 MM HG: CPT | Mod: CPTII,S$GLB,, | Performed by: FAMILY MEDICINE

## 2024-11-12 PROCEDURE — 3061F NEG MICROALBUMINURIA REV: CPT | Mod: CPTII,S$GLB,, | Performed by: FAMILY MEDICINE

## 2024-11-12 PROCEDURE — 3008F BODY MASS INDEX DOCD: CPT | Mod: CPTII,S$GLB,, | Performed by: FAMILY MEDICINE

## 2024-11-12 PROCEDURE — 99999 PR PBB SHADOW E&M-EST. PATIENT-LVL III: CPT | Mod: PBBFAC,,, | Performed by: FAMILY MEDICINE

## 2024-11-12 PROCEDURE — 1160F RVW MEDS BY RX/DR IN RCRD: CPT | Mod: CPTII,S$GLB,, | Performed by: FAMILY MEDICINE

## 2024-11-12 RX ORDER — DICLOFENAC SODIUM 10 MG/G
2 GEL TOPICAL 4 TIMES DAILY PRN
Qty: 100 G | Refills: 11 | Status: SHIPPED | OUTPATIENT
Start: 2024-11-12

## 2024-11-12 RX ORDER — FLUTICASONE PROPIONATE 50 MCG
1 SPRAY, SUSPENSION (ML) NASAL 2 TIMES DAILY
Qty: 16 G | Refills: 11 | Status: SHIPPED | OUTPATIENT
Start: 2024-11-12

## 2024-11-12 RX ORDER — LINAGLIPTIN 5 MG/1
5 TABLET, FILM COATED ORAL DAILY
Qty: 90 TABLET | Refills: 3 | Status: SHIPPED | OUTPATIENT
Start: 2024-11-12

## 2024-11-12 RX ORDER — METFORMIN HYDROCHLORIDE 500 MG/1
500 TABLET, EXTENDED RELEASE ORAL 2 TIMES DAILY WITH MEALS
Qty: 180 TABLET | Refills: 3 | Status: SHIPPED | OUTPATIENT
Start: 2024-11-12 | End: 2025-11-12

## 2024-11-12 NOTE — PROGRESS NOTES
.Subjective     Patient ID: Kelin Keller is a 69 y.o. female.    Chief Complaint: Hypertension and Diabetes    HPI  Kelin presents for follow-up of poorly controlled diabetes and kidney function concerns.    HPI:  Kelin's diabetes is poorly controlled with a current A1C of 9. She reports adherence to prescribed diabetic medications, including Trigenta, Starly, and glimepiride. Her blood sugar measured 193 this morning, which is elevated. She acknowledges poor dietary choices, including consumption of high-carbohydrate foods such as waffles, potatoes, and plantains. Kelin's kidney function has declined, with the eGFR dropping from 54 to 48, indicating worsening stage 3A chronic kidney disease. She previously discontinued metformin after two weeks due to side effects including abdominal discomfort and diarrhea. Kelin reports back pain and uses diclofenac gel for relief. She denies having any lung conditions, atherosclerosis in the aorta, purple bruising on the skin, or hyperparathyroidism.    MEDICATIONS:  Kelin is on Trigenta (Trajenta), Starly (Starlix), and Glimepiride for diabetes management. She is also using Flonase for allergies and Voltaren (diclofenac) cream applied to her back for pain relief. She takes a sleep medication, 1 tablet 30 minutes before bed.    MEDICAL HISTORY:  Kelin has a history of diabetes and stage 3A chronic kidney disease.    TEST RESULTS:  Her current A1C is 9, indicating poorly controlled diabetes. Kelin's morning blood sugar on the day of the visit was high at 193. Her calcium levels, which were previously slightly low, are now normal. Kidney function tests show a current GFR of 48, down from 54 previously, indicating worsening kidney function and confirming stage 3A chronic kidney disease.    ALLERGIES:  Kelin is allergic to iodine, though the specific reaction is not noted. She experiences diarrhea with Hydrochlorothiazide, which is noted as not a true allergy. Januvia causes  difficulty breathing and throat closure, requiring hospitalization and a Benadryl shot. She also reports severe vomiting with Brynn (possibly Roxicet), which led to a 3-day hospitalization.      ROS:  General: -fever, -chills, -fatigue, -weight gain, -weight loss  Eyes: -vision changes, -redness, -discharge  ENT: -ear pain, -nasal congestion, -sore throat  Cardiovascular: -chest pain, -palpitations, -lower extremity edema  Respiratory: -cough, -shortness of breath  Gastrointestinal: +abdominal pain, -nausea, +vomiting, +diarrhea, -constipation, -blood in stool  Genitourinary: -dysuria, -hematuria, -frequency  Musculoskeletal: -joint pain, -muscle pain, +back pain  Skin: -rash, -lesion  Neurological: -headache, -dizziness, -numbness, -tingling  Psychiatric: -anxiety, -depression, -sleep difficulty            Objective     Vitals:    11/12/24 0843   BP: (!) 140/82   Pulse: 101        Current Outpatient Medications   Medication Sig Dispense Refill    ACCU-CHEK GUIDE GLUCOSE METER [facility] PLEASE SEE ATTACHED FOR DETAILED DIRECTIONS      ACCU-CHEK GUIDE TEST STRIPS Strp 1 strip 2 (two) times daily.      atorvastatin (LIPITOR) 20 MG tablet Take 1 tablet (20 mg total) by mouth once daily. 90 tablet 3    butalbital-acetaminophen-caffeine -40 mg (FIORICET, ESGIC) -40 mg per tablet Take 1 tablet by mouth daily as needed for Headaches. 30 tablet 5    cetirizine (ZYRTEC) 10 MG tablet Take 1 tablet (10 mg total) by mouth once daily.      glimepiride (AMARYL) 2 MG tablet Take 1 tablet (2 mg total) by mouth every morning. 90 tablet 3    metoprolol succinate (TOPROL-XL) 25 MG 24 hr tablet Take 1 tablet (25 mg total) by mouth once daily. 90 tablet 3    montelukast (SINGULAIR) 10 mg tablet Take 1 tablet (10 mg total) by mouth every evening. 90 tablet 3    nateglinide (STARLIX) 60 MG tablet Take 1 tablet (60 mg total) by mouth 3 (three) times daily before meals. 270 tablet 3    neomycin-polymyxin-dexamethasone (MAXITROL)  3.5 mg/g-10,000 unit/g-0.1 % Oint Place into both eyes every 8 (eight) hours. 3.5 g 2    omeprazole (PRILOSEC) 40 MG capsule Take 1 capsule (40 mg total) by mouth once daily. 90 capsule 3    polyethylene glycol (GLYCOLAX) 17 gram/dose powder Take 17 g by mouth once daily.      diclofenac sodium (VOLTAREN ARTHRITIS PAIN) 1 % Gel Apply 2 g topically 4 (four) times daily as needed (pain). 100 g 11    fluticasone propionate (FLONASE) 50 mcg/actuation nasal spray 1 spray (50 mcg total) by Each Nostril route 2 (two) times daily. 16 g 11    metFORMIN (GLUCOPHAGE-XR) 500 MG ER 24hr tablet Take 1 tablet (500 mg total) by mouth 2 (two) times daily with meals. 180 tablet 3    TRADJENTA 5 mg Tab tablet Take 1 tablet (5 mg total) by mouth once daily. 90 tablet 3     No current facility-administered medications for this visit.        Physical Exam    General: No acute distress. Well-developed. Well-nourished.  Eyes: EOMI. Sclerae anicteric.  HENT: Normocephalic. Atraumatic. Nares patent. Moist oral mucosa.  Ears: Bilateral TMs clear. Bilateral EACs clear.  Cardiovascular: Regular rate. Regular rhythm. No murmurs. No rubs. No gallops. Normal S1, S2.  Respiratory: Normal respiratory effort. Clear to auscultation bilaterally. No rales. No rhonchi. No wheezing.  Abdomen: Soft. Non-tender. Non-distended. Normoactive bowel sounds.  Musculoskeletal: No  obvious deformity.  Extremities: No lower extremity edema.  Neurological: Alert & oriented x3. No slurred speech. Normal gait.  Psychiatric: Normal mood. Normal affect. Good insight. Good judgment.  Skin: Warm. Dry. No rash.          Assessment and Plan     Type 2 diabetes mellitus with stage 3a chronic kidney disease, without long-term current use of insulin  - Teaching on DMT2 including minimizing risk factors, diabetic diet, medications, exercise >150 minutes per week, and wt management.  Start Metformin and continue other diabetic meds as ordered.  Continue Glimepiride, Starlix, and  Tradjenta as ordered.  -     metFORMIN (GLUCOPHAGE-XR) 500 MG ER 24hr tablet; Take 1 tablet (500 mg total) by mouth 2 (two) times daily with meals.  Dispense: 180 tablet; Refill: 3  -     TRADJENTA 5 mg Tab tablet; Take 1 tablet (5 mg total) by mouth once daily.  Dispense: 90 tablet; Refill: 3  -     CBC Auto Differential; Future; Expected date: 11/12/2024  -     Comprehensive Metabolic Panel; Future; Expected date: 11/12/2024  -     Hemoglobin A1C; Future; Expected date: 11/12/2024    CKD stage 3a, GFR 45-59 ml/min  - Avoid nephrotoxins and minimize risk factors.  -     Comprehensive Metabolic Panel; Future; Expected date: 11/12/2024         - NEEDS ACE/ARB started next visit.    Primary hypertension         - Teaching on HTN including minimizing risk factors, low sodium diet, avoid salty foods such as processed meats/frozen meals/fast foods, exercise of at least 150 minutes per week and healthy weight/BMI management with wt loss recommended.         -    Continue Metoprolol.    Other hyperlipidemia  - Teaching on Hyperlipidemia including diet changes, exercise and wt loss.  Just started Lipitor.  Will titrate dose as needed.   -     Lipid Panel; Future; Expected date: 11/12/2024    Allergic rhinitis, unspecified seasonality, unspecified trigger  - Teaching on AR including avoiding triggers and treatment options.  Start Zyrtec and continue Flonase along with Singular daily.  Will start saline sinus rinses if needed.   -     fluticasone propionate (FLONASE) 50 mcg/actuation nasal spray; 1 spray (50 mcg total) by Each Nostril route 2 (two) times daily.  Dispense: 16 g; Refill: 11    Chronic bilateral low back pain without sciatica  - Conservative treatment including rest, ice/heat, message, exercise, compression, topical creams, and Tylenol.  No NSAIDS due to CKD.   -     diclofenac sodium (VOLTAREN ARTHRITIS PAIN) 1 % Gel; Apply 2 g topically 4 (four) times daily as needed (pain).  Dispense: 100 g; Refill:  11    Diverticulosis of colon     Assessment & Plan    Assessed patient's diabetes management, noting poor control with A1C of 9 despite current medication regimen  Determined dietary non-compliance as primary factor in poor glycemic control  Will add metformin to medication regimen due to its efficacy and to address worsening kidney function  Opted for long-acting metformin to mitigate potential GI side effects  Evaluated kidney function, noting decline from 54 to 48, emphasizing urgency of improved diabetes management  Considered but ruled out diagnoses of pulmonary fibrosis, aortic atherosclerosis, and hyperparathyroidism based on lack of supporting evidence  Noted blood pressure as well-controlled    DIABETES:  Emphasized the importance of proper diet in managing diabetes.  Clarified misconceptions about appropriate food choices for diabetics.  Educated on the conversion of carbohydrates to sugar in the body.  Emphasized the impact of dietary choices on kidney function.  Instructed on the importance of taking metformin with meals to reduce side effects.  Explained the different mechanisms of action for various diabetes medications.  Kelin to follow a strict diabetic diet consisting only of meats, vegetables (except potatoes and plantains), and fruits (limited to 3 servings per day).  Kelin to avoid all grains, including rice, bread, and waffles.  Kelin to limit banana consumption to half a banana per serving.  Kelin to keep a food diary to track dietary intake.  Started long-acting metformin, to be taken with food twice daily.  Continued Trigenta, Starly, and glimepiride.  Follow up for reassessment of diabetes management and kidney function, with goal to improve A1C to 6 range.    MEDICATION SIDE EFFECTS:  Clarified the difference between medication side effects and true allergies.    SLEEP DISORDER:  Kelin to follow sleep hygiene rules as previously instructed.    ALLERGIC RHINITIS:  Refilled Flonase nasal  spray.    BACK PAIN:  Refilled diclofenac gel (Voltaren) for back pain.    FOLLOW UP:  Follow up in about 4 months (around 3/12/2025) for Diabetes, HTN and chronic illnesses with labs 1 week prior to visit.    I spent a total of 60 minutes on the day of the visit.  This includes face to face time and non-face to face time preparing to see the patient (eg, review of tests), obtaining and/or reviewing separately obtained history, documenting clinical information in the electronic or other health record, independently interpreting results and communicating results to the patient/family/caregiver, or care coordinator.        This note was generated with the assistance of ambient listening technology. Verbal consent was obtained by the patient and accompanying visitor(s) for the recording of patient appointment to facilitate this note. I attest to having reviewed and edited the generated note for accuracy, though some syntax or spelling errors may persist. Please contact the author of this note for any clarification.

## 2024-11-13 DIAGNOSIS — E11.9 TYPE 2 DIABETES MELLITUS WITHOUT COMPLICATION, UNSPECIFIED WHETHER LONG TERM INSULIN USE: ICD-10-CM

## 2024-12-04 ENCOUNTER — OFFICE VISIT (OUTPATIENT)
Dept: FAMILY MEDICINE | Facility: CLINIC | Age: 69
End: 2024-12-04
Payer: MEDICARE

## 2024-12-04 ENCOUNTER — TELEPHONE (OUTPATIENT)
Dept: FAMILY MEDICINE | Facility: CLINIC | Age: 69
End: 2024-12-04
Payer: MEDICARE

## 2024-12-04 VITALS
SYSTOLIC BLOOD PRESSURE: 136 MMHG | DIASTOLIC BLOOD PRESSURE: 76 MMHG | WEIGHT: 149.5 LBS | HEART RATE: 81 BPM | HEIGHT: 61 IN | BODY MASS INDEX: 28.22 KG/M2 | OXYGEN SATURATION: 98 %

## 2024-12-04 DIAGNOSIS — N18.31 CKD STAGE 3A, GFR 45-59 ML/MIN: ICD-10-CM

## 2024-12-04 DIAGNOSIS — M54.50 CHRONIC BILATERAL LOW BACK PAIN WITHOUT SCIATICA: ICD-10-CM

## 2024-12-04 DIAGNOSIS — N18.31 TYPE 2 DIABETES MELLITUS WITH STAGE 3A CHRONIC KIDNEY DISEASE, WITHOUT LONG-TERM CURRENT USE OF INSULIN: Primary | ICD-10-CM

## 2024-12-04 DIAGNOSIS — G89.29 CHRONIC BILATERAL LOW BACK PAIN WITHOUT SCIATICA: ICD-10-CM

## 2024-12-04 DIAGNOSIS — R10.13 EPIGASTRIC PAIN: ICD-10-CM

## 2024-12-04 DIAGNOSIS — E11.22 TYPE 2 DIABETES MELLITUS WITH STAGE 3A CHRONIC KIDNEY DISEASE, WITHOUT LONG-TERM CURRENT USE OF INSULIN: Primary | ICD-10-CM

## 2024-12-04 DIAGNOSIS — I10 PRIMARY HYPERTENSION: ICD-10-CM

## 2024-12-04 DIAGNOSIS — E78.49 OTHER HYPERLIPIDEMIA: ICD-10-CM

## 2024-12-04 PROCEDURE — 3075F SYST BP GE 130 - 139MM HG: CPT | Mod: CPTII,S$GLB,, | Performed by: FAMILY MEDICINE

## 2024-12-04 PROCEDURE — 3078F DIAST BP <80 MM HG: CPT | Mod: CPTII,S$GLB,, | Performed by: FAMILY MEDICINE

## 2024-12-04 PROCEDURE — 3066F NEPHROPATHY DOC TX: CPT | Mod: CPTII,S$GLB,, | Performed by: FAMILY MEDICINE

## 2024-12-04 PROCEDURE — 3052F HG A1C>EQUAL 8.0%<EQUAL 9.0%: CPT | Mod: CPTII,S$GLB,, | Performed by: FAMILY MEDICINE

## 2024-12-04 PROCEDURE — 3288F FALL RISK ASSESSMENT DOCD: CPT | Mod: CPTII,S$GLB,, | Performed by: FAMILY MEDICINE

## 2024-12-04 PROCEDURE — 99999 PR PBB SHADOW E&M-EST. PATIENT-LVL IV: CPT | Mod: PBBFAC,,, | Performed by: FAMILY MEDICINE

## 2024-12-04 PROCEDURE — 3061F NEG MICROALBUMINURIA REV: CPT | Mod: CPTII,S$GLB,, | Performed by: FAMILY MEDICINE

## 2024-12-04 PROCEDURE — 1126F AMNT PAIN NOTED NONE PRSNT: CPT | Mod: CPTII,S$GLB,, | Performed by: FAMILY MEDICINE

## 2024-12-04 PROCEDURE — 1101F PT FALLS ASSESS-DOCD LE1/YR: CPT | Mod: CPTII,S$GLB,, | Performed by: FAMILY MEDICINE

## 2024-12-04 PROCEDURE — 99214 OFFICE O/P EST MOD 30 MIN: CPT | Mod: S$GLB,,, | Performed by: FAMILY MEDICINE

## 2024-12-04 PROCEDURE — 3008F BODY MASS INDEX DOCD: CPT | Mod: CPTII,S$GLB,, | Performed by: FAMILY MEDICINE

## 2024-12-04 PROCEDURE — 1159F MED LIST DOCD IN RCRD: CPT | Mod: CPTII,S$GLB,, | Performed by: FAMILY MEDICINE

## 2024-12-09 NOTE — PROGRESS NOTES
".Subjective     Patient ID: Kelin Keller is a 69 y.o. female.    Chief Complaint: No chief complaint on file.    HPI  History of Present Illness    CHIEF COMPLAINT:  Kelin presents today for follow-up on diabetes management.  Reviewed most recent lab results with patient.    DIABETES MANAGEMENT:  She reports improved blood sugar, with morning readings of 110 and bedtime readings of 95, a significant improvement from previous levels consistently around 260.   Her A1C remains elevated, though specific values were not mentioned.   She is currently taking metformin, glimibiride, Jardiance (empagliflozin), and Starlix (nateglinide). She takes metformin with meals, including a small meal at dinner, and Fragenta with dinner.   She has been attempting to take two metformin tablets but finds it difficult to tolerate the second dose due to abdominal pain and burning at night around 11 PM. She mistakenly believed metformin was causing low blood sugar, particularly when taken with dinner and the last dose of Starlix.    DIET AND WEIGHT:  She reports consuming 2-3 meals per day and describes her diet as "100% excellent" after making significant changes. She has lost 4 lbs since September, dropping from 153 to 149 lbs. For her evening meal, she primarily eats vegetables due to lack of appetite. She expresses hesitancy about making further dietary changes.    HYPERTENSION:  Taking hypertension medications as scheduled.  Denies any med s.e.    HYPERLIPIDEMIA:  Taking Lipitor as ordered.  Denies any medication s.e.    ACID REFLUX:  She experiences symptoms of acid reflux, including burning sensation in the throat, burping, and feeling acid in the nose. She is currently taking omeprazole for treatment.    LOWER BACK PAIN WITHOUT SCIATICA:  She reports experiencing muscular back pain,   No specific details about severity, frequency, or duration were provided.    ROS:  General: -fever, -chills, -fatigue, -weight gain, -weight " loss  Eyes: -vision changes, -redness, -discharge  ENT: -ear pain, -nasal congestion, -sore throat  Cardiovascular: -chest pain, -palpitations, -lower extremity edema  Respiratory: -cough, -shortness of breath  Gastrointestinal: +abdominal pain, -nausea, -vomiting, -diarrhea, -constipation, -blood in stool  Genitourinary: -dysuria, -hematuria, -frequency  Musculoskeletal: -joint pain, -muscle pain, +back pain  Skin: -rash, -lesion  Neurological: -headache, -dizziness, -numbness, -tingling  Psychiatric: -anxiety, -depression, -sleep difficulty          Objective     Vitals:    12/04/24 1507   BP: 136/76   Pulse: 81        Current Outpatient Medications   Medication Sig Dispense Refill    ACCU-CHEK GUIDE GLUCOSE METER Misc PLEASE SEE ATTACHED FOR DETAILED DIRECTIONS      ACCU-CHEK GUIDE TEST STRIPS Strp 1 strip 2 (two) times daily.      atorvastatin (LIPITOR) 20 MG tablet Take 1 tablet (20 mg total) by mouth once daily. 90 tablet 3    butalbital-acetaminophen-caffeine -40 mg (FIORICET, ESGIC) -40 mg per tablet Take 1 tablet by mouth daily as needed for Headaches. 30 tablet 5    cetirizine (ZYRTEC) 10 MG tablet Take 1 tablet (10 mg total) by mouth once daily.      diclofenac sodium (VOLTAREN ARTHRITIS PAIN) 1 % Gel Apply 2 g topically 4 (four) times daily as needed (pain). 100 g 11    fluticasone propionate (FLONASE) 50 mcg/actuation nasal spray 1 spray (50 mcg total) by Each Nostril route 2 (two) times daily. 16 g 11    glimepiride (AMARYL) 2 MG tablet Take 1 tablet (2 mg total) by mouth every morning. 90 tablet 3    metFORMIN (GLUCOPHAGE-XR) 500 MG ER 24hr tablet Take 1 tablet (500 mg total) by mouth 2 (two) times daily with meals. 180 tablet 3    metoprolol succinate (TOPROL-XL) 25 MG 24 hr tablet Take 1 tablet (25 mg total) by mouth once daily. 90 tablet 3    montelukast (SINGULAIR) 10 mg tablet Take 1 tablet (10 mg total) by mouth every evening. 90 tablet 3    nateglinide (STARLIX) 60 MG tablet Take 1  tablet (60 mg total) by mouth 3 (three) times daily before meals. 270 tablet 3    neomycin-polymyxin-dexamethasone (MAXITROL) 3.5 mg/g-10,000 unit/g-0.1 % Oint Place into both eyes every 8 (eight) hours. 3.5 g 2    omeprazole (PRILOSEC) 40 MG capsule Take 1 capsule (40 mg total) by mouth once daily. 90 capsule 3    polyethylene glycol (GLYCOLAX) 17 gram/dose powder Take 17 g by mouth once daily.      TRADJENTA 5 mg Tab tablet Take 1 tablet (5 mg total) by mouth once daily. 90 tablet 3     No current facility-administered medications for this visit.        Physical Exam    General: No acute distress. Well-developed. Well-nourished.  Eyes: EOMI. Sclerae anicteric.  HENT: Normocephalic. Atraumatic. Nares patent. Moist oral mucosa.  Ears: Bilateral TMs clear. Bilateral EACs clear.  Cardiovascular: Regular rate. Regular rhythm. No murmurs. No rubs. No gallops. Normal S1, S2. Normal heart sounds.  Respiratory: Normal respiratory effort. Clear to auscultation bilaterally. No rales. No rhonchi. No wheezing. Normal lung sounds.  Abdomen: Soft. Non-tender. Non-distended. Normoactive bowel sounds.  Musculoskeletal: No  obvious deformity.  Extremities: No lower extremity edema.  Neurological: Alert & oriented x3. No slurred speech. Normal gait.  Psychiatric: Normal mood. Normal affect. Good insight. Good judgment.  Skin: Warm. Dry. No rash.        Assessment and Plan     Type 2 diabetes mellitus with stage 3a chronic kidney disease, without long-term current use of insulin        - Not currently at goal.  Improve compliance with Metformin.  Continue other meds as ordered.        - Teaching on DMT2 including minimizing risk factors, diabetic diet, medications, exercise >150 minutes per week, and wt management.     CKD stage 3a, GFR 45-59 ml/min        - Teaching on CKD done including possible causes, treatment and warning symptoms.  Minimize risk factors including controlling BP and controlling Diabetes.  Avoid nephrotoxins  including NSAIDS.  Continue to monitor.     Primary hypertension        - Teaching on HTN including minimizing risk factors, low sodium diet, avoid salty foods such as processed meats/frozen meals/fast foods, exercise of at least 150 minutes per week and healthy weight/BMI management with wt loss recommended.         - BP at goal.  Continue medications as ordered.    Other hyperlipidemia         - Teaching on Hyperlipidemia including diet changes, exercise and wt loss.  Continue Lipitor as ordered and titrate dose.     GERD        - Teaching on GERD including GERD precautions, diet changes, preventing flare ups and treatment options.  Continue Prilosec daily.     Chronic bilateral low back pain without sciatica         - Conservative treatment including rest, ice/heat, message, exercise, compression, topical creams, and Tylenol.  No NSAIDS         Assessment & Plan    IMPRESSION:  - Patient's A1C is 9, indicating poor glycemic control  - Metformin is not causing reported low blood sugar episodes; likely due to Starlix  - Current metformin dose (500mg extended release) is significantly lower than typical diabetic regimen  - Considered potential for acid reflux based on reported symptoms  - Noted recent weight loss of 4 lbs since September, viewed as positive progress    TYPE 2 DIABETES MELLITUS:  - Explained metformin does not cause hypoglycemia, unlike other oral diabetes medications.  - Clarified that Starlix can cause low blood sugar if not taken with adequate food.  - Educated on typical dosing regimen for metformin in diabetic patients.  - Continued metformin 500mg extended release, encouraging increase to twice daily dosing if tolerable.  - Continued Starlix, emphasizing importance of taking with meals to prevent hypoglycemia.    GASTROESOPHAGEAL REFLUX DISEASE:  - Discussed proper timing of meals in relation to medication to prevent acid reflux.  - Recommend avoiding lying down for 1-2 hours after eating to  prevent acid reflux.  - Continued omeprazole, to be taken in the morning on an empty stomach.    LOW BACK PAIN:  - Kelin can consider using a heating pad or massage for back pain.  - Advised against taking ibuprofen or Motrin for back pain.    WEIGHT MANAGEMENT:  - Kelin to continue current diet changes.    FOLLOW-UP AND LABS:  - Lab work ordered before next appointment.  - Follow up on January 3rd as scheduled.            This note was generated with the assistance of ambient listening technology. Verbal consent was obtained by the patient and accompanying visitor(s) for the recording of patient appointment to facilitate this note. I attest to having reviewed and edited the generated note for accuracy, though some syntax or spelling errors may persist. Please contact the author of this note for any clarification.

## 2024-12-12 DIAGNOSIS — E11.22 TYPE 2 DIABETES MELLITUS WITH STAGE 3A CHRONIC KIDNEY DISEASE, WITHOUT LONG-TERM CURRENT USE OF INSULIN: ICD-10-CM

## 2024-12-12 DIAGNOSIS — N18.31 TYPE 2 DIABETES MELLITUS WITH STAGE 3A CHRONIC KIDNEY DISEASE, WITHOUT LONG-TERM CURRENT USE OF INSULIN: ICD-10-CM

## 2024-12-12 NOTE — TELEPHONE ENCOUNTER
Refill Routing Note   Medication(s) are not appropriate for processing by [facility] for the following reason(s):        Allergy or intolerance    [facility] action(s):  Defer        Medication Therapy Plan: Allergy/Contraindication: TRADJENTA   Reactions: Shortness Of Breath.    Pharmacist review requested: Yes     Appointments  past 12m or future 3m with PCP    Date Provider   Last Visit   12/4/2024 Sara Young MD   Next Visit   3/14/2025 Sara Young MD   ED visits in past 90 days: 0        Note composed:5:57 PM 12/12/2024

## 2024-12-12 NOTE — TELEPHONE ENCOUNTER
No care due was identified.  [facility] Embedded Care Due Messages. Reference number: [id number].   12/12/2024 1:27:58 PM CST

## 2024-12-13 NOTE — TELEPHONE ENCOUNTER
Refill Routing Note   Medication(s) are not appropriate for processing by [facility] for the following reason(s):        Allergy or intolerance    [facility] action(s):  Defer      Medication Therapy Plan: Allergy/Contraindication: TRADJENTA   Reactions: Shortness Of Breath.    Pharmacist review requested: Yes     Appointments  past 12m or future 3m with PCP    Date Provider   Last Visit   12/4/2024 Sara Young MD   Next Visit   3/14/2025 Sara Young MD   ED visits in past 90 days: 0        Note composed:11:56 PM 12/12/2024

## 2024-12-18 RX ORDER — LINAGLIPTIN 5 MG/1
5 TABLET, FILM COATED ORAL DAILY
Qty: 90 TABLET | Refills: 1 | Status: SHIPPED | OUTPATIENT
Start: 2024-12-18

## 2024-12-31 ENCOUNTER — PATIENT OUTREACH (OUTPATIENT)
Dept: ADMINISTRATIVE | Facility: HOSPITAL | Age: 69
End: 2024-12-31
Payer: MEDICARE

## 2025-02-28 LAB
LEFT EYE DM RETINOPATHY: NEGATIVE
RIGHT EYE DM RETINOPATHY: NEGATIVE

## 2025-03-12 ENCOUNTER — LAB VISIT (OUTPATIENT)
Dept: LAB | Facility: HOSPITAL | Age: 70
End: 2025-03-12
Attending: FAMILY MEDICINE
Payer: MEDICARE

## 2025-03-12 DIAGNOSIS — N18.31 CKD STAGE 3A, GFR 45-59 ML/MIN: ICD-10-CM

## 2025-03-12 DIAGNOSIS — N18.31 TYPE 2 DIABETES MELLITUS WITH STAGE 3A CHRONIC KIDNEY DISEASE, WITHOUT LONG-TERM CURRENT USE OF INSULIN: ICD-10-CM

## 2025-03-12 DIAGNOSIS — E11.22 TYPE 2 DIABETES MELLITUS WITH STAGE 3A CHRONIC KIDNEY DISEASE, WITHOUT LONG-TERM CURRENT USE OF INSULIN: ICD-10-CM

## 2025-03-12 DIAGNOSIS — E78.49 OTHER HYPERLIPIDEMIA: ICD-10-CM

## 2025-03-12 LAB
ALBUMIN SERPL BCP-MCNC: 3.9 G/DL (ref 3.5–5.2)
ALP SERPL-CCNC: 125 U/L (ref 40–150)
ALT SERPL W/O P-5'-P-CCNC: 18 U/L (ref 10–44)
ANION GAP SERPL CALC-SCNC: 10 MMOL/L (ref 8–16)
AST SERPL-CCNC: 17 U/L (ref 10–40)
BASOPHILS # BLD AUTO: 0.08 K/UL (ref 0–0.2)
BASOPHILS NFR BLD: 1 % (ref 0–1.9)
BILIRUB SERPL-MCNC: 0.5 MG/DL (ref 0.1–1)
BUN SERPL-MCNC: 16 MG/DL (ref 8–23)
CALCIUM SERPL-MCNC: 9.5 MG/DL (ref 8.7–10.5)
CHLORIDE SERPL-SCNC: 101 MMOL/L (ref 95–110)
CHOLEST SERPL-MCNC: 121 MG/DL (ref 120–199)
CHOLEST/HDLC SERPL: 2.8 {RATIO} (ref 2–5)
CO2 SERPL-SCNC: 23 MMOL/L (ref 23–29)
CREAT SERPL-MCNC: 0.9 MG/DL (ref 0.5–1.4)
DIFFERENTIAL METHOD BLD: ABNORMAL
EOSINOPHIL # BLD AUTO: 0.3 K/UL (ref 0–0.5)
EOSINOPHIL NFR BLD: 3 % (ref 0–8)
ERYTHROCYTE [DISTWIDTH] IN BLOOD BY AUTOMATED COUNT: 12 % (ref 11.5–14.5)
EST. GFR  (NO RACE VARIABLE): >60 ML/MIN/1.73 M^2
ESTIMATED AVG GLUCOSE: 123 MG/DL (ref 68–131)
GLUCOSE SERPL-MCNC: 95 MG/DL (ref 70–110)
HBA1C MFR BLD: 5.9 % (ref 4–5.6)
HCT VFR BLD AUTO: 35.1 % (ref 37–48.5)
HDLC SERPL-MCNC: 44 MG/DL (ref 40–75)
HDLC SERPL: 36.4 % (ref 20–50)
HGB BLD-MCNC: 12 G/DL (ref 12–16)
IMM GRANULOCYTES # BLD AUTO: 0.03 K/UL (ref 0–0.04)
IMM GRANULOCYTES NFR BLD AUTO: 0.4 % (ref 0–0.5)
LDLC SERPL CALC-MCNC: 45 MG/DL (ref 63–159)
LYMPHOCYTES # BLD AUTO: 2.5 K/UL (ref 1–4.8)
LYMPHOCYTES NFR BLD: 30.4 % (ref 18–48)
MCH RBC QN AUTO: 32.4 PG (ref 27–31)
MCHC RBC AUTO-ENTMCNC: 34.2 G/DL (ref 32–36)
MCV RBC AUTO: 95 FL (ref 82–98)
MONOCYTES # BLD AUTO: 0.8 K/UL (ref 0.3–1)
MONOCYTES NFR BLD: 10.1 % (ref 4–15)
NEUTROPHILS # BLD AUTO: 4.6 K/UL (ref 1.8–7.7)
NEUTROPHILS NFR BLD: 55.1 % (ref 38–73)
NONHDLC SERPL-MCNC: 77 MG/DL
NRBC BLD-RTO: 0 /100 WBC
PLATELET # BLD AUTO: 222 K/UL (ref 150–450)
PMV BLD AUTO: 10.3 FL (ref 9.2–12.9)
POTASSIUM SERPL-SCNC: 4.5 MMOL/L (ref 3.5–5.1)
PROT SERPL-MCNC: 7.8 G/DL (ref 6–8.4)
RBC # BLD AUTO: 3.7 M/UL (ref 4–5.4)
SODIUM SERPL-SCNC: 134 MMOL/L (ref 136–145)
TRIGL SERPL-MCNC: 160 MG/DL (ref 30–150)
WBC # BLD AUTO: 8.31 K/UL (ref 3.9–12.7)

## 2025-03-12 PROCEDURE — 83036 HEMOGLOBIN GLYCOSYLATED A1C: CPT | Performed by: FAMILY MEDICINE

## 2025-03-12 PROCEDURE — 80053 COMPREHEN METABOLIC PANEL: CPT | Performed by: FAMILY MEDICINE

## 2025-03-12 PROCEDURE — 85025 COMPLETE CBC W/AUTO DIFF WBC: CPT | Performed by: FAMILY MEDICINE

## 2025-03-12 PROCEDURE — 80061 LIPID PANEL: CPT | Performed by: FAMILY MEDICINE

## 2025-03-12 PROCEDURE — 36415 COLL VENOUS BLD VENIPUNCTURE: CPT | Mod: PO | Performed by: FAMILY MEDICINE

## 2025-03-14 ENCOUNTER — OFFICE VISIT (OUTPATIENT)
Dept: FAMILY MEDICINE | Facility: CLINIC | Age: 70
End: 2025-03-14
Payer: MEDICARE

## 2025-03-14 VITALS
BODY MASS INDEX: 26.73 KG/M2 | SYSTOLIC BLOOD PRESSURE: 138 MMHG | HEART RATE: 97 BPM | OXYGEN SATURATION: 98 % | WEIGHT: 141.56 LBS | HEIGHT: 61 IN | DIASTOLIC BLOOD PRESSURE: 80 MMHG

## 2025-03-14 DIAGNOSIS — E78.5 HYPERLIPIDEMIA, UNSPECIFIED HYPERLIPIDEMIA TYPE: ICD-10-CM

## 2025-03-14 DIAGNOSIS — J30.9 ALLERGIC RHINITIS, UNSPECIFIED SEASONALITY, UNSPECIFIED TRIGGER: ICD-10-CM

## 2025-03-14 DIAGNOSIS — K57.30 DIVERTICULOSIS OF COLON: ICD-10-CM

## 2025-03-14 DIAGNOSIS — G43.009 MIGRAINE WITHOUT AURA AND WITHOUT STATUS MIGRAINOSUS, NOT INTRACTABLE: ICD-10-CM

## 2025-03-14 DIAGNOSIS — E11.22 TYPE 2 DIABETES MELLITUS WITH STAGE 3A CHRONIC KIDNEY DISEASE, WITHOUT LONG-TERM CURRENT USE OF INSULIN: Primary | ICD-10-CM

## 2025-03-14 DIAGNOSIS — G89.29 CHRONIC BILATERAL LOW BACK PAIN WITHOUT SCIATICA: ICD-10-CM

## 2025-03-14 DIAGNOSIS — Z12.11 SCREENING FOR COLON CANCER: ICD-10-CM

## 2025-03-14 DIAGNOSIS — N18.31 CKD STAGE 3A, GFR 45-59 ML/MIN: ICD-10-CM

## 2025-03-14 DIAGNOSIS — I10 PRIMARY HYPERTENSION: ICD-10-CM

## 2025-03-14 DIAGNOSIS — Z12.31 ENCOUNTER FOR SCREENING MAMMOGRAM FOR MALIGNANT NEOPLASM OF BREAST: ICD-10-CM

## 2025-03-14 DIAGNOSIS — Z23 IMMUNIZATION DUE: ICD-10-CM

## 2025-03-14 DIAGNOSIS — N18.31 TYPE 2 DIABETES MELLITUS WITH STAGE 3A CHRONIC KIDNEY DISEASE, WITHOUT LONG-TERM CURRENT USE OF INSULIN: Primary | ICD-10-CM

## 2025-03-14 DIAGNOSIS — M54.50 CHRONIC BILATERAL LOW BACK PAIN WITHOUT SCIATICA: ICD-10-CM

## 2025-03-14 PROCEDURE — 99999 PR PBB SHADOW E&M-EST. PATIENT-LVL IV: CPT | Mod: PBBFAC,,, | Performed by: FAMILY MEDICINE

## 2025-03-14 PROCEDURE — 3044F HG A1C LEVEL LT 7.0%: CPT | Mod: CPTII,S$GLB,, | Performed by: FAMILY MEDICINE

## 2025-03-14 PROCEDURE — 3008F BODY MASS INDEX DOCD: CPT | Mod: CPTII,S$GLB,, | Performed by: FAMILY MEDICINE

## 2025-03-14 PROCEDURE — 3288F FALL RISK ASSESSMENT DOCD: CPT | Mod: CPTII,S$GLB,, | Performed by: FAMILY MEDICINE

## 2025-03-14 PROCEDURE — 1126F AMNT PAIN NOTED NONE PRSNT: CPT | Mod: CPTII,S$GLB,, | Performed by: FAMILY MEDICINE

## 2025-03-14 PROCEDURE — 3079F DIAST BP 80-89 MM HG: CPT | Mod: CPTII,S$GLB,, | Performed by: FAMILY MEDICINE

## 2025-03-14 PROCEDURE — 99214 OFFICE O/P EST MOD 30 MIN: CPT | Mod: 25,S$GLB,, | Performed by: FAMILY MEDICINE

## 2025-03-14 PROCEDURE — G0009 ADMIN PNEUMOCOCCAL VACCINE: HCPCS | Mod: S$GLB,,, | Performed by: FAMILY MEDICINE

## 2025-03-14 PROCEDURE — 1159F MED LIST DOCD IN RCRD: CPT | Mod: CPTII,S$GLB,, | Performed by: FAMILY MEDICINE

## 2025-03-14 PROCEDURE — 90677 PCV20 VACCINE IM: CPT | Mod: S$GLB,,, | Performed by: FAMILY MEDICINE

## 2025-03-14 PROCEDURE — 1160F RVW MEDS BY RX/DR IN RCRD: CPT | Mod: CPTII,S$GLB,, | Performed by: FAMILY MEDICINE

## 2025-03-14 PROCEDURE — 4010F ACE/ARB THERAPY RXD/TAKEN: CPT | Mod: CPTII,S$GLB,, | Performed by: FAMILY MEDICINE

## 2025-03-14 PROCEDURE — 3075F SYST BP GE 130 - 139MM HG: CPT | Mod: CPTII,S$GLB,, | Performed by: FAMILY MEDICINE

## 2025-03-14 PROCEDURE — 1101F PT FALLS ASSESS-DOCD LE1/YR: CPT | Mod: CPTII,S$GLB,, | Performed by: FAMILY MEDICINE

## 2025-03-14 RX ORDER — LINAGLIPTIN 5 MG/1
5 TABLET, FILM COATED ORAL DAILY
Qty: 90 TABLET | Refills: 3 | Status: SHIPPED | OUTPATIENT
Start: 2025-03-14

## 2025-03-14 RX ORDER — LISINOPRIL 20 MG/1
20 TABLET ORAL DAILY
Qty: 90 TABLET | Refills: 1 | Status: SHIPPED | OUTPATIENT
Start: 2025-03-14 | End: 2026-03-14

## 2025-03-14 RX ORDER — BUTALBITAL, ACETAMINOPHEN AND CAFFEINE 50; 325; 40 MG/1; MG/1; MG/1
1 TABLET ORAL DAILY PRN
Qty: 30 TABLET | Refills: 5 | Status: SHIPPED | OUTPATIENT
Start: 2025-03-14

## 2025-03-14 RX ORDER — NATEGLINIDE 60 MG/1
60 TABLET ORAL
Qty: 180 TABLET | Refills: 1 | Status: SHIPPED | OUTPATIENT
Start: 2025-03-14

## 2025-03-14 NOTE — PROGRESS NOTES
.Subjective     Patient ID: Kelin Keller is a 69 y.o. female.    Chief Complaint: Follow-up (For dm)    HPI  History of Present Illness    CHIEF COMPLAINT:  Kelin presents today for follow up of diabetes management.  Reviewed lab results with patient.    DIABETES MANAGEMENT:  She reports experiencing nocturnal hypoglycemic episodes with glucose levels of 65-66 mg/dL, accompanied by headaches. She manages these episodes with orange juice. A1C has improved from 9.0 to 5.9. She continues metformin twice daily, Glimepiride daily, Tradjenta daily, and Starlix 3 times daily for diabetes management.    BLOOD PRESSURE:  Home blood pressure readings range from 120/63 to 120/80, measured 2-3 times per week.  Did not bring readings today.    HEADACHES:  She experiences daily headaches occurring throughout her head mainly in the morning due to hypoglycemia.   She reports previous successful treatment with Topamax (topiramate) for pain management.    ALLERGIES AND ENT:  She reports severe sore throat affecting her sleep. She admits to inconsistent use of prescribed nasal spray for allergy management. Montelukast was discontinued due to dizziness.    GASTROINTESTINAL:  She takes omeprazole every morning for acid reflux.    WEIGHT MANAGEMENT:  She reports improved well-being following recent weight loss, noting changes in body composition requiring new clothing.      ROS:  General: -fever, -chills, -fatigue, -weight gain, -weight loss  Eyes: -vision changes, -redness, -discharge  ENT: -ear pain, +nasal congestion, +sore throat  Cardiovascular: -chest pain, -palpitations, -lower extremity edema  Respiratory: +cough, -shortness of breath  Gastrointestinal: -abdominal pain, -nausea, -vomiting, -diarrhea, -constipation, -blood in stool, +indigestion, +heartburn  Genitourinary: -dysuria, -hematuria, -frequency  Musculoskeletal: -joint pain, -muscle pain, +back pain  Skin: -rash, -lesion  Neurological: +headache, -dizziness,  -numbness, -tingling  Psychiatric: -anxiety, -depression, -sleep difficulty            Objective     Vitals:    03/14/25 0809   BP: 138/80   Pulse: 97        Current Medications[1]     Physical Exam    Vitals: Blood pressure: 138/80.  General: No acute distress. Well-developed. Well-nourished.  Eyes: EOMI. Sclerae anicteric.  HENT: Normocephalic. Atraumatic. Nares patent. Moist oral mucosa.  Ears: Bilateral TMs clear. Bilateral EACs clear.  Cardiovascular: Regular rate. Regular rhythm. No murmurs. No rubs. No gallops. Normal S1, S2.  Respiratory: Normal respiratory effort. Clear to auscultation bilaterally. No rales. No rhonchi. No wheezing.  Abdomen: Soft. Non-tender. Non-distended. Normoactive bowel sounds.  Musculoskeletal: No  obvious deformity.  Extremities: No lower extremity edema.  Neurological: Alert & oriented x3. No slurred speech. Normal gait.  Psychiatric: Normal mood. Normal affect. Good insight. Good judgment.  Skin: Warm. Dry. No rash.  Diabetic Foot: Normal sensation in feet.            Assessment and Plan     Type 2 diabetes mellitus with stage 3a chronic kidney disease, without long-term current use of insulin  - Diabetes currently well controlled with A1C at goal.  - Hypoglycemia at night so stop evening dose of Starlix and decrease to bid instead of tid.  Continue Metformin, Tradjenta and Glimepiride as ordered.  - Teaching on DMT2 including minimizing risk factors, diabetic diet, medications, exercise >150 minutes per week, and wt management.   -     TRADJENTA 5 mg Tab tablet; Take 1 tablet (5 mg total) by mouth once daily.  Dispense: 90 tablet; Refill: 3  -     CBC Auto Differential; Future; Expected date: 06/14/2025  -     Comprehensive Metabolic Panel; Future; Expected date: 06/14/2025  -     Hemoglobin A1C; Future; Expected date: 06/14/2025  -     TSH; Future; Expected date: 06/14/2025  -     Urinalysis, Reflex to Urine Culture Urine, Clean Catch; Future; Expected date: 06/14/2025  -      Microalbumin/Creatinine Ratio, Urine; Future; Expected date: 06/14/2025  -     lisinopriL (PRINIVIL,ZESTRIL) 20 MG tablet; Take 1 tablet (20 mg total) by mouth once daily.  Dispense: 90 tablet; Refill: 1    Primary hypertension  -     lisinopriL (PRINIVIL,ZESTRIL) 20 MG tablet; Take 1 tablet (20 mg total) by mouth once daily.  Dispense: 90 tablet; Refill: 1    CKD stage 3a, GFR 45-59 ml/min    Hyperlipidemia, unspecified hyperlipidemia type  -     Lipid Panel; Future; Expected date: 06/14/2025    Allergic rhinitis, unspecified seasonality, unspecified trigger        - Teaching on AR including avoiding triggers and treatment options.  Continue Zyrtec.  Will start Flonase, Singular and/or saline sinus rinses if needed.     Migraine without aura and without status migrainosus, not intractable  -     butalbital-acetaminophen-caffeine -40 mg (FIORICET, ESGIC) -40 mg per tablet; Take 1 tablet by mouth daily as needed for Headaches.  Dispense: 30 tablet; Refill: 5    Chronic bilateral low back pain without sciatica    Diverticulosis of colon         - Asymptomatic.  Continue to monitor.    Encounter for screening mammogram for malignant neoplasm of breast  -     Mammo Digital Screening Bilat; Future; Expected date: 03/14/2025    Screening for colon cancer  -     Fecal Immunochemical Test (iFOBT); Future; Expected date: 03/14/2025    Immunization due  -     pneumoc 20-adalberto conj-dip cr(PF) (PREVNAR-20 (PF)) injection Syrg 0.5 mL  -     Discontinue: COVID-19 (Moderna) 50 mcg/0.5 mL IM vaccine (>/= 11 yo) 0.5 mL    Other orders  -     nateglinide (STARLIX) 60 MG tablet; Take 1 tablet (60 mg total) by mouth 2 (two) times daily before meals.  Dispense: 180 tablet; Refill: 1    Assessment & Plan    IMPRESSION:  - Adjusted diabetes management due to improved A1C (5.9) and episodes of nocturnal hypoglycemia.  - Discontinued evening dose of Starlix to prevent nighttime hypoglycemia. Decreased Starlix to twice daily  (morning and midday only).  - Started Lisinopril 20 mg daily for kidney protection.  - Deferred initiation of Topamax for headache prevention, opting to first assess if headaches resolve with Starlix adjustment.  - Restarted nasal spray for seasonal allergies.  - Discussed significance of improvement in EGFR (now above 60) and its relation to better diabetes management.    PLAN SUMMARY:  - Administered pneumonia vaccine  - Prescribed Fluticasone nasal spray for allergies  - Added Lisinopril for kidney protection  - Reduced Starlix to twice daily (stopping evening dose)  - Continued Omeprazole, Atorvastatin, Metformin, Glimepiride, Tradjenta, and Metoprolol  - Ordered stool test for colon cancer screening  - Schedule mammogram and bone density scan (DEXA) on the same day (not Wednesday)  - Return for labs before next appointment  - Follow up in 4 months  - Discuss COVID booster availability at next visit    PULMONARY FIBROSIS, UNSPECIFIED:  - Evaluated patient's throat and performed physical exam.  - Blood pressure measured at 138/80.  - Assessed that sore throat and coughing symptoms are likely due to allergies and springtime.  - Recommend restarting nasal spray for allergies and prescribed Fluticasone nasal spray.  - Administered pneumonia vaccine.    TYPE 2 DIABETES MELLITUS WITH DIABETIC POLYNEUROPATHY:  - Noted significant improvement in patient's A1C from 9.0 to 5.9.  - Performed foot exam with patient scoring 100%.  - Assessed improved diabetes management leading to better kidney function.  - Educated patient on importance of kidney protection medication for all diabetics.  - Adjusted medications: Reduced Starlix to twice daily (stopping evening dose to prevent nighttime hypoglycemia), continued Metformin twice daily, Glimepiride in the morning, and Tradjenta (refilled with additional refills).  - Planned to add Lisinopril for kidney protection.  - Instructed to monitor for resolution of nighttime low blood  sugars after Starlix adjustment.  - Continue current diet and lifestyle changes that have led to improved diabetes control.    VASCULAR HEADACHE:  - Kelin reports daily headaches, mainly in the morning.  - Evaluated potential link to nocturnal hypoglycemia.  - Assessed that stopping evening Starlix may resolve the headaches.  - Instructed to monitor for resolution of morning headaches after Starlix adjustment.  - Considered prescribing Topamax if headaches persist.    ALLERGIC RHINITIS:  - Confirmed patient is taking allergy medicine daily.  - Ensured patient has prescription for nasal spray.    GASTROESOPHAGEAL REFLUX DISEASE:  - Noted improvement in acid reflux symptoms.  - Assessed patient's eating habits in relation to acid reflux.  - Continued Omeprazole every morning for management.    ESSENTIAL HYPERTENSION:  - Kelin reports home readings of 120/63 to 120/80, checking 2-3 times per week.  - Evaluated blood pressure as good.  - Continued Metoprolol for management.  - Planned to add Lisinopril for kidney protection, which may also affect blood pressure.  - Instructed patient to contact office if experiencing any side effects from new medication (Lisinopril).    HYPERLIPIDEMIA:  - Noted cholesterol levels are good, with slightly elevated triglycerides at 163.  - Assessed that no medication changes are needed.  - Continued Atorvastatin for management.  - Recommend watching diet to address slightly eleva  kait triglycerides.    FOLLOW-UP AND PREVENTIVE CARE:  - Schedule mammogram and bone density scan (DEXA) on the same day (not Wednesday).  - Return for labs before next appointment.  - Ordered stool test for colon cancer screening.  - Follow up in 4 months.  - Discuss COVID booster availability at next visit.              Follow up in about 4 months (around 7/14/2025) for Diabetes, HTN and chronic illnesses with labs 1 week prior to visit.    This note was generated with the assistance of ambient listening  technology. Verbal consent was obtained by the patient and accompanying visitor(s) for the recording of patient appointment to facilitate this note. I attest to having reviewed and edited the generated note for accuracy, though some syntax or spelling errors may persist. Please contact the author of this note for any clarification.         [1]   Current Outpatient Medications   Medication Sig Dispense Refill    ACCU-CHEK GUIDE GLUCOSE METER Misc PLEASE SEE ATTACHED FOR DETAILED DIRECTIONS      ACCU-CHEK GUIDE TEST STRIPS Strp 1 strip 2 (two) times daily.      atorvastatin (LIPITOR) 20 MG tablet Take 1 tablet (20 mg total) by mouth once daily. 90 tablet 3    cetirizine (ZYRTEC) 10 MG tablet Take 1 tablet (10 mg total) by mouth once daily.      diclofenac sodium (VOLTAREN ARTHRITIS PAIN) 1 % Gel Apply 2 g topically 4 (four) times daily as needed (pain). 100 g 11    fluticasone propionate (FLONASE) 50 mcg/actuation nasal spray 1 spray (50 mcg total) by Each Nostril route 2 (two) times daily. 16 g 11    glimepiride (AMARYL) 2 MG tablet Take 1 tablet (2 mg total) by mouth every morning. 90 tablet 3    metFORMIN (GLUCOPHAGE-XR) 500 MG ER 24hr tablet Take 1 tablet (500 mg total) by mouth 2 (two) times daily with meals. 180 tablet 3    metoprolol succinate (TOPROL-XL) 25 MG 24 hr tablet Take 1 tablet (25 mg total) by mouth once daily. 90 tablet 3    neomycin-polymyxin-dexamethasone (MAXITROL) 3.5 mg/g-10,000 unit/g-0.1 % Oint Place into both eyes every 8 (eight) hours. 3.5 g 2    omeprazole (PRILOSEC) 40 MG capsule Take 1 capsule (40 mg total) by mouth once daily. 90 capsule 3    polyethylene glycol (GLYCOLAX) 17 gram/dose powder Take 17 g by mouth once daily.      butalbital-acetaminophen-caffeine -40 mg (FIORICET, ESGIC) -40 mg per tablet Take 1 tablet by mouth daily as needed for Headaches. 30 tablet 5    lisinopriL (PRINIVIL,ZESTRIL) 20 MG tablet Take 1 tablet (20 mg total) by mouth once daily. 90 tablet 1     nateglinide (STARLIX) 60 MG tablet Take 1 tablet (60 mg total) by mouth 2 (two) times daily before meals. 180 tablet 1    TRADJENTA 5 mg Tab tablet Take 1 tablet (5 mg total) by mouth once daily. 90 tablet 3     No current facility-administered medications for this visit.

## 2025-03-17 ENCOUNTER — LAB VISIT (OUTPATIENT)
Dept: LAB | Facility: HOSPITAL | Age: 70
End: 2025-03-17
Attending: FAMILY MEDICINE
Payer: MEDICARE

## 2025-03-17 DIAGNOSIS — Z12.11 SCREENING FOR COLON CANCER: ICD-10-CM

## 2025-03-17 PROCEDURE — 82274 ASSAY TEST FOR BLOOD FECAL: CPT | Performed by: FAMILY MEDICINE

## 2025-03-18 ENCOUNTER — PATIENT OUTREACH (OUTPATIENT)
Dept: ADMINISTRATIVE | Facility: HOSPITAL | Age: 70
End: 2025-03-18
Payer: MEDICARE

## 2025-03-18 NOTE — PROGRESS NOTES
Health Maintenance Topic(s) Outreach Outcomes & Actions Taken:    Eye Exam - Outreach Outcomes & Actions Taken  : Diabetic Eye External Records Uploaded, Care Team & History Updated if Applicable

## 2025-03-19 ENCOUNTER — HOSPITAL ENCOUNTER (OUTPATIENT)
Dept: RADIOLOGY | Facility: HOSPITAL | Age: 70
Discharge: HOME | End: 2025-03-19
Attending: FAMILY MEDICINE
Payer: MEDICARE

## 2025-03-19 DIAGNOSIS — Z12.31 ENCOUNTER FOR SCREENING MAMMOGRAM FOR MALIGNANT NEOPLASM OF BREAST: ICD-10-CM

## 2025-03-19 PROCEDURE — 77067 SCR MAMMO BI INCL CAD: CPT | Mod: TC

## 2025-04-24 ENCOUNTER — RESULTS FOLLOW-UP (OUTPATIENT)
Dept: FAMILY MEDICINE | Facility: CLINIC | Age: 70
End: 2025-04-24

## 2025-04-29 ENCOUNTER — OFFICE VISIT (OUTPATIENT)
Dept: FAMILY MEDICINE | Facility: CLINIC | Age: 70
End: 2025-04-29
Payer: MEDICARE

## 2025-04-29 VITALS
BODY MASS INDEX: 26.39 KG/M2 | SYSTOLIC BLOOD PRESSURE: 138 MMHG | HEIGHT: 61 IN | DIASTOLIC BLOOD PRESSURE: 68 MMHG | HEART RATE: 100 BPM | WEIGHT: 139.75 LBS | OXYGEN SATURATION: 97 %

## 2025-04-29 DIAGNOSIS — R05.2 SUBACUTE COUGH: ICD-10-CM

## 2025-04-29 DIAGNOSIS — J30.2 SEASONAL ALLERGIC RHINITIS, UNSPECIFIED TRIGGER: Primary | ICD-10-CM

## 2025-04-29 PROCEDURE — 3288F FALL RISK ASSESSMENT DOCD: CPT | Mod: CPTII,S$GLB,,

## 2025-04-29 PROCEDURE — 99999 PR PBB SHADOW E&M-EST. PATIENT-LVL IV: CPT | Mod: PBBFAC,,,

## 2025-04-29 PROCEDURE — 4010F ACE/ARB THERAPY RXD/TAKEN: CPT | Mod: CPTII,S$GLB,,

## 2025-04-29 PROCEDURE — 1125F AMNT PAIN NOTED PAIN PRSNT: CPT | Mod: CPTII,S$GLB,,

## 2025-04-29 PROCEDURE — 99214 OFFICE O/P EST MOD 30 MIN: CPT | Mod: S$GLB,,,

## 2025-04-29 PROCEDURE — 1159F MED LIST DOCD IN RCRD: CPT | Mod: CPTII,S$GLB,,

## 2025-04-29 PROCEDURE — 3008F BODY MASS INDEX DOCD: CPT | Mod: CPTII,S$GLB,,

## 2025-04-29 PROCEDURE — 1101F PT FALLS ASSESS-DOCD LE1/YR: CPT | Mod: CPTII,S$GLB,,

## 2025-04-29 PROCEDURE — 3075F SYST BP GE 130 - 139MM HG: CPT | Mod: CPTII,S$GLB,,

## 2025-04-29 PROCEDURE — 2023F DILAT RTA XM W/O RTNOPTHY: CPT | Mod: CPTII,S$GLB,,

## 2025-04-29 PROCEDURE — 1160F RVW MEDS BY RX/DR IN RCRD: CPT | Mod: CPTII,S$GLB,,

## 2025-04-29 PROCEDURE — 3044F HG A1C LEVEL LT 7.0%: CPT | Mod: CPTII,S$GLB,,

## 2025-04-29 PROCEDURE — 3078F DIAST BP <80 MM HG: CPT | Mod: CPTII,S$GLB,,

## 2025-04-29 RX ORDER — PROMETHAZINE HYDROCHLORIDE AND DEXTROMETHORPHAN HYDROBROMIDE 6.25; 15 MG/5ML; MG/5ML
5 SYRUP ORAL EVERY 6 HOURS PRN
Qty: 118 ML | Refills: 0 | Status: SHIPPED | OUTPATIENT
Start: 2025-04-29 | End: 2025-05-09

## 2025-04-29 RX ORDER — HYDROGEN PEROXIDE 3 %
40 SOLUTION, NON-ORAL MISCELLANEOUS EVERY MORNING
COMMUNITY
Start: 2025-02-03

## 2025-04-29 RX ORDER — TRIAMCINOLONE ACETONIDE 1 MG/G
CREAM TOPICAL
COMMUNITY
Start: 2025-02-14

## 2025-04-29 RX ORDER — ALBUTEROL SULFATE 90 UG/1
2 INHALANT RESPIRATORY (INHALATION) EVERY 6 HOURS PRN
Qty: 8 G | Refills: 1 | Status: SHIPPED | OUTPATIENT
Start: 2025-04-29

## 2025-04-29 RX ORDER — LEVOCETIRIZINE DIHYDROCHLORIDE 5 MG/1
5 TABLET, FILM COATED ORAL NIGHTLY
Qty: 30 TABLET | Refills: 11 | Status: SHIPPED | OUTPATIENT
Start: 2025-04-29 | End: 2026-04-29

## 2025-04-29 NOTE — PROGRESS NOTES
[facility]    4/29/2025      Subjective:       Patient ID:  Kelin is a 70 y.o. female .  has a past medical history of Diabetes mellitus and Hypertension.    History of Present Illness    CHIEF COMPLAINT:  Kelin presents today for persistent cough of five weeks duration.    RESPIRATORY:  She reports a dry, scratchy throat cough that worsens at night with occasional shortness of breath. She has an albuterol inhaler for as needed use. She notes an enlarged tonsil on one side that becomes apparent with excessive coughing, though not present at time of visit.    ALLERGIES:  She reports chronic allergy symptoms including watery eyes and sneezing, particularly in the morning. She currently uses Flonase. Previously tried Singulair but discontinued due to drowsiness and dizziness.    SOCIAL HISTORY:  She is a non-smoker.      ROS:  Constitutional: -chills, -fever  Respiratory: +cough, +shortness of breath, +waking at night coughing  Cardiovascular: -chest pain  Gastrointestinal: -abdominal pain, -constipation, -diarrhea, +nausea, -vomiting, +indigestion  Neurological: -dizziness, -lightheadedness, -headaches  Throat: +sore throat  Eyes: +eye discharge  Allergic: +frequent sneezing  Nose: +runny nose, +nasal discharge           Problem List[1]      Last HgbA1C:    Lab Results   Component Value Date    HGBA1C 5.9 (H) 03/12/2025    HGBA1C 9.0 (H) 11/05/2024    HGBA1C 8.7 (H) 08/12/2024         Last Lipid Panel:    Lab Results   Component Value Date    HDL 44 03/12/2025    HDL 49 08/12/2024       Lab Results   Component Value Date    LDLCALC 45.0 (L) 03/12/2025    LDLCALC 71.4 08/12/2024       Lab Results   Component Value Date    TRIG 160 (H) 03/12/2025    TRIG 163 (H) 08/12/2024       Lab Results   Component Value Date    CHOLHDL 36.4 03/12/2025    CHOLHDL 32.0 08/12/2024         Review of patient's allergies indicates:   Allergen Reactions    Iodinated contrast media Swelling     throat     Iodine Swelling     Difficulty swallowing    Januvia [sitagliptin] Shortness Of Breath        Medication List with Changes/Refills   New Medications    ALBUTEROL (VENTOLIN HFA) 90 MCG/ACTUATION INHALER    Inhale 2 puffs into the lungs every 6 (six) hours as needed for Wheezing. Rescue    LEVOCETIRIZINE (XYZAL) 5 MG TABLET    Take 1 tablet (5 mg total) by mouth every evening.    PROMETHAZINE-DEXTROMETHORPHAN (PROMETHAZINE-DM) 6.25-15 MG/5 ML SYRP    Take 5 mLs by mouth every 6 (six) hours as needed (cough).   Current Medications    ACCU-CHEK GUIDE GLUCOSE METER MISC    PLEASE SEE ATTACHED FOR DETAILED DIRECTIONS    ACCU-CHEK GUIDE TEST STRIPS STRP    1 strip 2 (two) times daily.    ATORVASTATIN (LIPITOR) 20 MG TABLET    Take 1 tablet (20 mg total) by mouth once daily.    BUTALBITAL-ACETAMINOPHEN-CAFFEINE -40 MG (FIORICET, ESGIC) -40 MG PER TABLET    Take 1 tablet by mouth daily as needed for Headaches.    CETIRIZINE (ZYRTEC) 10 MG TABLET    Take 1 tablet (10 mg total) by mouth once daily.    DICLOFENAC SODIUM (VOLTAREN ARTHRITIS PAIN) 1 % GEL    Apply 2 g topically 4 (four) times daily as needed (pain).    ESOMEPRAZOLE (NEXIUM) 20 MG CAPSULE    Take 40 mg by mouth every morning.    FLUTICASONE PROPIONATE (FLONASE) 50 MCG/ACTUATION NASAL SPRAY    1 spray (50 mcg total) by Each Nostril route 2 (two) times daily.    GLIMEPIRIDE (AMARYL) 2 MG TABLET    Take 1 tablet (2 mg total) by mouth every morning.    LISINOPRIL (PRINIVIL,ZESTRIL) 20 MG TABLET    Take 1 tablet (20 mg total) by mouth once daily.    METFORMIN (GLUCOPHAGE-XR) 500 MG ER 24HR TABLET    Take 1 tablet (500 mg total) by mouth 2 (two) times daily with meals.    METOPROLOL SUCCINATE (TOPROL-XL) 25 MG 24 HR TABLET    Take 1 tablet (25 mg total) by mouth once daily.    NATEGLINIDE (STARLIX) 60 MG TABLET    Take 1 tablet (60 mg total) by mouth 2 (two) times daily before meals.    NEOMYCIN-POLYMYXIN-DEXAMETHASONE (MAXITROL) 3.5 MG/G-10,000  "UNIT/G-0.1 % OINT    Place into both eyes every 8 (eight) hours.    OMEPRAZOLE (PRILOSEC) 40 MG CAPSULE    Take 1 capsule (40 mg total) by mouth once daily.    POLYETHYLENE GLYCOL (GLYCOLAX) 17 GRAM/DOSE POWDER    Take 17 g by mouth once daily.    TRADJENTA 5 MG TAB TABLET    Take 1 tablet (5 mg total) by mouth once daily.    TRIAMCINOLONE ACETONIDE 0.1% (KENALOG) 0.1 % CREAM    SMARTSI sparingly Topical Twice Daily PRN               Objective:      /68 (BP Location: Right arm, Patient Position: Sitting)   Pulse 100   Ht 5' 1" (1.549 m)   Wt 63.4 kg (139 lb 12.4 oz)   LMP 2006   SpO2 97%   BMI 26.41 kg/m²   Estimated body mass index is 26.41 kg/m² as calculated from the following:    Height as of this encounter: 5' 1" (1.549 m).    Weight as of this encounter: 63.4 kg (139 lb 12.4 oz).    Physical Exam  Vitals reviewed.   Constitutional:       General: She is not in acute distress.     Appearance: Normal appearance.   HENT:      Head: Normocephalic and atraumatic.      Nose: Nose normal.      Mouth/Throat:      Mouth: Mucous membranes are moist.   Eyes:      General: Allergic shiner present.      Conjunctiva/sclera: Conjunctivae normal.      Pupils: Pupils are equal, round, and reactive to light.   Cardiovascular:      Rate and Rhythm: Normal rate and regular rhythm.   Pulmonary:      Effort: Pulmonary effort is normal. No respiratory distress.   Musculoskeletal:         General: Normal range of motion.      Cervical back: Normal range of motion.   Skin:     General: Skin is warm.   Neurological:      Mental Status: She is alert and oriented to person, place, and time.   Psychiatric:         Mood and Affect: Mood normal.         Behavior: Behavior normal.             Assessment and Plan:   1. Seasonal allergic rhinitis, unspecified trigger  - levocetirizine (XYZAL) 5 MG tablet; Take 1 tablet (5 mg total) by mouth every evening.  Dispense: 30 tablet; Refill: 11  - promethazine-dextromethorphan " (PROMETHAZINE-DM) 6.25-15 mg/5 mL Syrp; Take 5 mLs by mouth every 6 (six) hours as needed (cough).  Dispense: 118 mL; Refill: 0    2. Subacute cough  - promethazine-dextromethorphan (PROMETHAZINE-DM) 6.25-15 mg/5 mL Syrp; Take 5 mLs by mouth every 6 (six) hours as needed (cough).  Dispense: 118 mL; Refill: 0  - albuterol (VENTOLIN HFA) 90 mcg/actuation inhaler; Inhale 2 puffs into the lungs every 6 (six) hours as needed for Wheezing. Rescue  Dispense: 8 g; Refill: 1     Assessment & Plan      IMPRESSION:  - Persistent cough of 5 weeks duration, likely due to allergies given runny eyes and high pollen count.  - Previous treatments included cough syrup and albuterol inhaler.  - Determined need for allergy management to address underlying cause of cough.  - Evaluated effectiveness of previous medications and reported side effects.    ALLERGIC RHINITIS:  - Monitored patient's allergy symptoms including rhinorrhea, lacrimation, and morning sneezing  - Instructed on proper technique for using Flonase nasal spray, directing to aim slightly towards ears rather than straight back to target sinus passages effectively.  - Prescribed Xyzal (levocetirizine) to be taken nightly for allergy symptom management.  - Prescribed promethazine-DM for cough and albuterol as needed          The patient was informed of the following statements     Emergency Care:Seek immediate medical attention in the emergency room if you experience any new or worsening symptoms, or if your current condition significantly changes or becomes more severe.  Patient Acknowledgment: Patient verbalizes understanding of the plan and agrees to proceed with the recommended care.      Follow Up:  7/18/2025 Sara Young MD   Future Appointments   Date Time Provider Department Center   7/14/2025  8:00 AM [facility] [place]   7/14/2025 10:15 AM [facility] [place]   7/18/2025  9:00 AM Sara Young MD [facility] [place]                        Other Orders Placed This Visit:  No orders of the defined types were placed in this encounter.        This note was generated with the assistance of ambient listening technology. Verbal consent was obtained by the patient and accompanying visitor(s) for the recording of patient appointment to facilitate this note. I attest to having reviewed and edited the generated note for accuracy, though some syntax or spelling errors may persist. Please contact the author of this note for any clarification.        Bekah Gandhi PA-C           [1]   Patient Active Problem List  Diagnosis    Type 2 diabetes mellitus with stage 3a chronic kidney disease, without long-term current use of insulin    Primary hypertension    Hyperlipidemia    Allergic rhinitis    Migraine without aura and without status migrainosus, not intractable    CKD stage 3a, GFR 45-59 ml/min    Chronic bilateral low back pain without sciatica    Diverticulosis of colon

## 2025-05-27 ENCOUNTER — OFFICE VISIT (OUTPATIENT)
Dept: FAMILY MEDICINE | Facility: CLINIC | Age: 70
End: 2025-05-27
Payer: MEDICARE

## 2025-05-27 ENCOUNTER — PATIENT OUTREACH (OUTPATIENT)
Dept: ADMINISTRATIVE | Facility: OTHER | Age: 70
End: 2025-05-27

## 2025-05-27 VITALS
OXYGEN SATURATION: 96 % | BODY MASS INDEX: 26.6 KG/M2 | WEIGHT: 140.88 LBS | HEART RATE: 88 BPM | DIASTOLIC BLOOD PRESSURE: 82 MMHG | HEIGHT: 61 IN | SYSTOLIC BLOOD PRESSURE: 150 MMHG

## 2025-05-27 DIAGNOSIS — I12.9 HYPERTENSIVE KIDNEY DISEASE WITH STAGE 3A CHRONIC KIDNEY DISEASE: Primary | ICD-10-CM

## 2025-05-27 DIAGNOSIS — R05.3 CHRONIC COUGH: ICD-10-CM

## 2025-05-27 DIAGNOSIS — N18.31 HYPERTENSIVE KIDNEY DISEASE WITH STAGE 3A CHRONIC KIDNEY DISEASE: Primary | ICD-10-CM

## 2025-05-27 DIAGNOSIS — J30.89 ALLERGIC RHINITIS DUE TO OTHER ALLERGIC TRIGGER, UNSPECIFIED SEASONALITY: ICD-10-CM

## 2025-05-27 DIAGNOSIS — N18.31 TYPE 2 DIABETES MELLITUS WITH STAGE 3A CHRONIC KIDNEY DISEASE, WITHOUT LONG-TERM CURRENT USE OF INSULIN: ICD-10-CM

## 2025-05-27 DIAGNOSIS — E11.22 TYPE 2 DIABETES MELLITUS WITH STAGE 3A CHRONIC KIDNEY DISEASE, WITHOUT LONG-TERM CURRENT USE OF INSULIN: ICD-10-CM

## 2025-05-27 PROCEDURE — 4010F ACE/ARB THERAPY RXD/TAKEN: CPT | Mod: CPTII,S$GLB,, | Performed by: FAMILY MEDICINE

## 2025-05-27 PROCEDURE — 3079F DIAST BP 80-89 MM HG: CPT | Mod: CPTII,S$GLB,, | Performed by: FAMILY MEDICINE

## 2025-05-27 PROCEDURE — 99999 PR PBB SHADOW E&M-EST. PATIENT-LVL V: CPT | Mod: PBBFAC,,, | Performed by: FAMILY MEDICINE

## 2025-05-27 PROCEDURE — 2023F DILAT RTA XM W/O RTNOPTHY: CPT | Mod: CPTII,S$GLB,, | Performed by: FAMILY MEDICINE

## 2025-05-27 PROCEDURE — 3288F FALL RISK ASSESSMENT DOCD: CPT | Mod: CPTII,S$GLB,, | Performed by: FAMILY MEDICINE

## 2025-05-27 PROCEDURE — 1126F AMNT PAIN NOTED NONE PRSNT: CPT | Mod: CPTII,S$GLB,, | Performed by: FAMILY MEDICINE

## 2025-05-27 PROCEDURE — 3008F BODY MASS INDEX DOCD: CPT | Mod: CPTII,S$GLB,, | Performed by: FAMILY MEDICINE

## 2025-05-27 PROCEDURE — 99214 OFFICE O/P EST MOD 30 MIN: CPT | Mod: S$GLB,,, | Performed by: FAMILY MEDICINE

## 2025-05-27 PROCEDURE — 1101F PT FALLS ASSESS-DOCD LE1/YR: CPT | Mod: CPTII,S$GLB,, | Performed by: FAMILY MEDICINE

## 2025-05-27 PROCEDURE — 3077F SYST BP >= 140 MM HG: CPT | Mod: CPTII,S$GLB,, | Performed by: FAMILY MEDICINE

## 2025-05-27 PROCEDURE — 3044F HG A1C LEVEL LT 7.0%: CPT | Mod: CPTII,S$GLB,, | Performed by: FAMILY MEDICINE

## 2025-05-27 RX ORDER — DAPAGLIFLOZIN 5 MG/1
5 TABLET, FILM COATED ORAL DAILY
Qty: 90 TABLET | Refills: 3 | Status: SHIPPED | OUTPATIENT
Start: 2025-05-27

## 2025-05-27 RX ORDER — AMLODIPINE BESYLATE 5 MG/1
5 TABLET ORAL DAILY
Qty: 90 TABLET | Refills: 3 | Status: SHIPPED | OUTPATIENT
Start: 2025-05-27 | End: 2026-05-27

## 2025-05-28 ENCOUNTER — OFFICE VISIT (OUTPATIENT)
Dept: ALLERGY | Facility: CLINIC | Age: 70
End: 2025-05-28
Payer: MEDICARE

## 2025-05-28 ENCOUNTER — HOSPITAL ENCOUNTER (OUTPATIENT)
Dept: RADIOLOGY | Facility: HOSPITAL | Age: 70
Discharge: HOME | End: 2025-05-28
Attending: FAMILY MEDICINE
Payer: MEDICARE

## 2025-05-28 VITALS — BODY MASS INDEX: 26.47 KG/M2 | HEIGHT: 61 IN | WEIGHT: 140.19 LBS

## 2025-05-28 DIAGNOSIS — H10.403 CHRONIC CONJUNCTIVITIS OF BOTH EYES, UNSPECIFIED CHRONIC CONJUNCTIVITIS TYPE: ICD-10-CM

## 2025-05-28 DIAGNOSIS — J31.0 CHRONIC RHINITIS: ICD-10-CM

## 2025-05-28 DIAGNOSIS — R05.2 SUBACUTE COUGH: Primary | ICD-10-CM

## 2025-05-28 DIAGNOSIS — R05.3 CHRONIC COUGH: ICD-10-CM

## 2025-05-28 PROCEDURE — 3008F BODY MASS INDEX DOCD: CPT | Mod: CPTII,S$GLB,, | Performed by: STUDENT IN AN ORGANIZED HEALTH CARE EDUCATION/TRAINING PROGRAM

## 2025-05-28 PROCEDURE — 1160F RVW MEDS BY RX/DR IN RCRD: CPT | Mod: CPTII,S$GLB,, | Performed by: STUDENT IN AN ORGANIZED HEALTH CARE EDUCATION/TRAINING PROGRAM

## 2025-05-28 PROCEDURE — 4010F ACE/ARB THERAPY RXD/TAKEN: CPT | Mod: CPTII,S$GLB,, | Performed by: STUDENT IN AN ORGANIZED HEALTH CARE EDUCATION/TRAINING PROGRAM

## 2025-05-28 PROCEDURE — 99204 OFFICE O/P NEW MOD 45 MIN: CPT | Mod: S$GLB,,, | Performed by: STUDENT IN AN ORGANIZED HEALTH CARE EDUCATION/TRAINING PROGRAM

## 2025-05-28 PROCEDURE — 99999 PR PBB SHADOW E&M-EST. PATIENT-LVL IV: CPT | Mod: PBBFAC,,, | Performed by: STUDENT IN AN ORGANIZED HEALTH CARE EDUCATION/TRAINING PROGRAM

## 2025-05-28 PROCEDURE — 71046 X-RAY EXAM CHEST 2 VIEWS: CPT | Mod: TC,FY,PO

## 2025-05-28 PROCEDURE — 1125F AMNT PAIN NOTED PAIN PRSNT: CPT | Mod: CPTII,S$GLB,, | Performed by: STUDENT IN AN ORGANIZED HEALTH CARE EDUCATION/TRAINING PROGRAM

## 2025-05-28 PROCEDURE — 71046 X-RAY EXAM CHEST 2 VIEWS: CPT | Mod: 26,,, | Performed by: RADIOLOGY

## 2025-05-28 PROCEDURE — 1159F MED LIST DOCD IN RCRD: CPT | Mod: CPTII,S$GLB,, | Performed by: STUDENT IN AN ORGANIZED HEALTH CARE EDUCATION/TRAINING PROGRAM

## 2025-05-28 PROCEDURE — 3044F HG A1C LEVEL LT 7.0%: CPT | Mod: CPTII,S$GLB,, | Performed by: STUDENT IN AN ORGANIZED HEALTH CARE EDUCATION/TRAINING PROGRAM

## 2025-05-28 RX ORDER — AZELASTINE 1 MG/ML
SPRAY, METERED NASAL
Qty: 30 ML | Refills: 11 | Status: SHIPPED | OUTPATIENT
Start: 2025-05-28

## 2025-05-28 NOTE — PROGRESS NOTES
ALLERGY & IMMUNOLOGY CLINIC - INITIAL CONSULTATION     This note was generated with the assistance of ambient listening technology. Verbal consent was obtained by the patient and accompanying visitor(s) for the recording of patient appointment to facilitate this note. I attest to having reviewed and edited the generated note for accuracy, though some syntax or spelling errors may persist. Please contact the author of this note for any clarification.     HISTORY OF PRESENT ILLNESS     Patient ID: Kelin Keller is a 70 y.o. female    CC: cough, chronic rhinitis     HPI: Kelin Keller is a 70 y.o. female with DM2, HTN, and CKD, presenting for cough and chronic rhinitis.   Patient was referred by Lydnsey Rodriguez A.M., MD.  Patient speaks English, but Sinhala is listed as her preferred language. Professional  services were offered and declined.    She has been coughing for 2 months. She reports the allergies are chronic but this cough is newer.  Symptoms are worse at night.   The cough is dry.  She feels the cough stems from throat. Starts with a tickle/scratchy feeling.   No shortness of breath or wheezing.   Albuterol didn't help.  She says she doesn't think the cough started with a URI, just started with a pain in her throat.   She has reflux, worse than usual. She says metformin messed up her stomach and made her reflux worse. She says as soon as she lays down, she feels the reflux. She is on omeprazole; she says it helps somewhat.   She stopped lisinopril 3-4 days ago. She was started on lisinopril 2 months ago, just before the cough started. The cough might be a little better now that she is off the lisinopril.   Promethazine-DM helped a little.     She reports that dust and grass trigger her allergies. She says she had allergy testing in 1989, she recalls testing positive to pollen, dust, and dog. She gets runny nose. Watery itchy eyes. Stuffy nose at night. Ears itchy.   She denies post-nasal  drip.  Symptoms are perennial.   Symptoms occur every day.  There is no noticeable difference between indoors and outdoors.   The patient denies anosmia.   She said zyrtec didn't help. So she switched to xyzal, which she finds helpful, but only for 6-8 hours.   She stopped the flonase because she was worried it was causing tonsil pain, and it gave her headaches.   She sometimes uses pataday.     MEDICAL HISTORY     Vaccines:   Immunization History   Administered Date(s) Administered    COVID-19 MRNA, LN-S PF (MODERNA HALF 0.25 ML DOSE) 12/07/2021    COVID-19, MRNA, LN-S, PF (MODERNA FULL 0.5 ML DOSE) 02/25/2021, 03/25/2021, 04/09/2021, 06/01/2021    Influenza (FLUAD) - Quadrivalent - Adjuvanted - PF *Preferred* (65+) 10/02/2023, 10/06/2023    Influenza - Quadrivalent - MDCK - PF 11/16/2022    Influenza - Trivalent - Afluria, Fluzone MDV 10/28/2010, 01/21/2014, 09/23/2020, 12/22/2020, 09/27/2021, 10/14/2021, 09/20/2022    Influenza - Trivalent - Fluad - Adjuvanted - PF (65 years and older 10/14/2024    Influenza - Trivalent - Fluarix, Flulaval, Fluzone, Afluria - PF 02/18/2011, 09/26/2016    Influenza - Trivalent - Flucelvax - PF 09/26/2024    Influenza - Trivalent - Fluzone High Dose - PF (65 years and older) 09/29/2019    Influenza A (H1N1) 2009 Monovalent - IM 02/01/2010    Pneumococcal Conjugate - 20 Valent 03/14/2025    Pneumococcal Polysaccharide - 23 Valent 02/18/2011    Tdap 05/20/2018    Zoster Recombinant 04/02/2018, 07/17/2018     Medical Hx:   Problem List[1]    Surgical Hx:   Past Surgical History:   Procedure Laterality Date    BLEPHAROPLASTY, UPPER EYELID Bilateral 11/11/2022    Procedure: BLEPHAROPLASTY, UPPER EYELID;  Surgeon: Roseanna Larry MD;  Location: [facility] OR 1ST FLR;  Service: Ophthalmology;  Laterality: Bilateral;    CHOLECYSTECTOMY  1980    EXCISION OF LESION OF EYELID Left 11/11/2022    Procedure: EXCISION, LESION, EYELID;  Surgeon: Roseanna Larry MD;  [facility];  Service:  "Ophthalmology;  Laterality: Left;    EXPLORATION OF ORBIT Bilateral 11/11/2022    Procedure: EXPLORATION, ORBIT;  Surgeon: Roseanna Larry MD;  Location: [facility] OR [address] [place] [address];  Service: Ophthalmology;  Laterality: Bilateral;    SIGMOIDECTOMY  2010     Medications:   Medications Ordered Prior to Encounter[2]    H/o Asthma: denies  H/o Rhinitis: endorses    Drug Allergies:   Review of patient's allergies indicates:   Allergen Reactions    Iodinated contrast media Swelling     throat    Iodine Swelling     Difficulty swallowing    Januvia [sitagliptin] Shortness Of Breath    Lisinopril Other (See Comments)     Cough     Env/Occ:   Pets: dog, doesn't seem to trigger symptoms   Occupation: retired (cleaned houses for 33 years)    Social Hx:   Social History[3]    Family Hx:   Family History   Problem Relation Name Age of Onset    Asthma Mother        PHYSICAL EXAM     VS: Ht 5' 1" (1.549 m)   Wt 63.6 kg (140 lb 3.4 oz)   LMP 01/01/2006   BMI 26.49 kg/m²   GENERAL: Alert, NAD, well-appearing  EYES: EOMI, no conjunctival injection, no discharge, no infraorbital shiners  NOSE: NT 2+ B/L, no stringing mucus, no polyps visualized  ORAL: MMM, no ulcers, no thrush  LUNGS: CTAB, no w/r/c, no increased WOB  HEART: RRR, normal S1/S2, no m/g/r  DERM: no rashes     LABORATORY STUDIES     Component      Latest Ref [facility] 11/5/2024 3/12/2025   WBC      3.90 - 12.70 K/uL 9.44  8.31    RBC      4.00 - 5.40 M/uL 3.90 (L)  3.70 (L)    Hemoglobin      12.0 - 16.0 g/dL 12.8  12.0    Hematocrit      37.0 - 48.5 % 37.4  35.1 (L)    MCV      82 - 98 fL 96  95    MCH      27.0 - 31.0 pg 32.8 (H)  32.4 (H)    MCHC      32.0 - 36.0 g/dL 34.2  34.2    RDW      11.5 - 14.5 % 12.4  12.0    Platelet Count      150 - 450 K/uL 193  222    MPV      9.2 - 12.9 fL 10.6  10.3    Immature Granulocytes      0.0 - 0.5 % 0.3  0.4    Gran # (ANC)      1.8 - 7.7 K/uL 6.2  4.6    Immature Grans (Abs)      0.00 - 0.04 K/uL 0.03  0.03    Lymph #      1.0 - 4.8 K/uL 2.2  " 2.5    Mono #      0.3 - 1.0 K/uL 0.8  0.8    Eos #      0.0 - 0.5 K/uL 0.2  0.3    Baso #      0.00 - 0.20 K/uL 0.07  0.08    Differential Method Automated  Automated    Sodium      136 - 145 mmol/L 136  134 (L)    Potassium      3.5 - 5.1 mmol/L 4.7  4.5    Chloride      95 - 110 mmol/L 102  101    CO2      23 - 29 mmol/L 23  23    Glucose      70 - 110 mg/dL 260 (H)  95    BUN      8 - 23 mg/dL 15  16    Creatinine      0.5 - 1.4 mg/dL 1.2  0.9    Calcium      8.7 - 10.5 mg/dL 9.4  9.5    PROTEIN TOTAL      6.0 - 8.4 g/dL 7.9  7.8    Albumin      3.5 - 5.2 g/dL 3.9  3.9    BILIRUBIN TOTAL      0.1 - 1.0 mg/dL 0.6  0.5    ALP      40 - 150 U/L 151 (H)  125    AST      10 - 40 U/L 24  17    ALT      10 - 44 U/L 23  18    eGFR      >60 mL/min/1.73 m^2 48.7 !  >60.0    Anion Gap      8 - 16 mmol/L 11  10    Hemoglobin A1C External      4.0 - 5.6 % 9.0 (H)  5.9 (H)       ALLERGEN TESTING     Immunocaps: Ordered.     IMAGING & OTHER DIAGNOSTICS     CXR 5/27/25:  FINDINGS:  Cardiac size is normal.  Lungs are clear and well aerated.  No infiltrate is seen.     CHART REVIEW     Reviewed fam med note, labs, imaging.      ASSESSMENT & PLAN     Kelin Keller is a 70 y.o. female with     # Subacute cough: x2 months. Suspect ACE inhibitor induced cough, given temporal relationship with lisinopril initiation (started 2 months ago; stopped 3-4 days ago and cough may be improving). Reflux may be potential contributing factor to cough (would consider GI referral if cough doesn't improve). She feels cough stems from throat. Asthma unlikely due to lack of lower respiratory symptoms and no improvement from albuterol. Considered post-viral cough, but patient doesn't recall having a URI. Allergic rhinitis may be exacerbating symptoms, but unclear if directly causing cough due to lack of noticeable post-nasal drip.  -ACEi cough most likely, will revaluate in 1-2 months now that she is off the lisinopril.     # Chronic  rhinoconjunctivitis: Symptoms daily and perennial. She didn't tolerate flonase (said it caused tonsil pain and headaches). She did not find cetirizine helpful; does find levocetirizine helpful (but only for 6-8 hours).   -immunocaps for aeroallergens ordered.  -start azelastine nasal spray 1-2 SEN BID. Instructed on proper technique.  -continue levocetirizine 5 mg nightly. Can trial increase to BID (but decreased back down to once nightly if it causes drowsiness).   -continue pataday eyedrops prn.      Follow up: 1-2 months     I spent a total of 50 minutes on the day of the visit.  This includes face to face time and non-face to face time preparing to see the patient (eg, review of tests), obtaining and/or reviewing separately obtained history, documenting clinical information in the electronic or other health record.    Pricila Espitia MD  Allergy/Immunology         [1]   Patient Active Problem List  Diagnosis    Type 2 diabetes mellitus with stage 3a chronic kidney disease, without long-term current use of insulin    Primary hypertension    Hyperlipidemia    Allergic rhinitis    Migraine without aura and without status migrainosus, not intractable    CKD stage 3a, GFR 45-59 ml/min    Chronic bilateral low back pain without sciatica    Diverticulosis of colon   [2]   Current Outpatient Medications on File Prior to Visit   Medication Sig Dispense Refill    ACCU-CHEK GUIDE GLUCOSE METER Misc PLEASE SEE ATTACHED FOR DETAILED DIRECTIONS      ACCU-CHEK GUIDE TEST STRIPS Strp 1 strip 2 (two) times daily.      albuterol (VENTOLIN HFA) 90 mcg/actuation inhaler Inhale 2 puffs into the lungs every 6 (six) hours as needed for Wheezing. Rescue 8 g 1    amLODIPine (NORVASC) 5 MG tablet Take 1 tablet (5 mg total) by mouth once daily. 90 tablet 3    atorvastatin (LIPITOR) 20 MG tablet Take 1 tablet (20 mg total) by mouth once daily. 90 tablet 3    butalbital-acetaminophen-caffeine -40 mg (FIORICET, ESGIC) -40 mg per  tablet Take 1 tablet by mouth daily as needed for Headaches. 30 tablet 5    cetirizine (ZYRTEC) 10 MG tablet Take 1 tablet (10 mg total) by mouth once daily.      dapagliflozin propanediol (FARXIGA) 5 mg Tab tablet Take 1 tablet (5 mg total) by mouth once daily. 90 tablet 3    diclofenac sodium (VOLTAREN ARTHRITIS PAIN) 1 % Gel Apply 2 g topically 4 (four) times daily as needed (pain). 100 g 11    esomeprazole (NEXIUM) 20 MG capsule Take 40 mg by mouth every morning.      fluticasone propionate (FLONASE) 50 mcg/actuation nasal spray 1 spray (50 mcg total) by Each Nostril route 2 (two) times daily. 16 g 11    glimepiride (AMARYL) 2 MG tablet Take 1 tablet (2 mg total) by mouth every morning. 90 tablet 3    levocetirizine (XYZAL) 5 MG tablet Take 1 tablet (5 mg total) by mouth every evening. 30 tablet 11    metFORMIN (GLUCOPHAGE-XR) 500 MG ER 24hr tablet Take 1 tablet (500 mg total) by mouth 2 (two) times daily with meals. 180 tablet 3    metoprolol succinate (TOPROL-XL) 25 MG 24 hr tablet Take 1 tablet (25 mg total) by mouth once daily. 90 tablet 3    nateglinide (STARLIX) 60 MG tablet Take 1 tablet (60 mg total) by mouth 2 (two) times daily before meals. 180 tablet 1    neomycin-polymyxin-dexamethasone (MAXITROL) 3.5 mg/g-10,000 unit/g-0.1 % Oint Place into both eyes every 8 (eight) hours. 3.5 g 2    polyethylene glycol (GLYCOLAX) 17 gram/dose powder Take 17 g by mouth once daily.      TRADJENTA 5 mg Tab tablet Take 1 tablet (5 mg total) by mouth once daily. 90 tablet 3    triamcinolone acetonide 0.1% (KENALOG) 0.1 % cream SMARTSI sparingly Topical Twice Daily PRN      omeprazole (PRILOSEC) 40 MG capsule Take 1 capsule (40 mg total) by mouth once daily. 90 capsule 3     No current facility-administered medications on file prior to visit.   [3]   Social History  Tobacco Use    Smoking status: Never     Passive exposure: Current    Smokeless tobacco: Never   Substance Use Topics    Alcohol use: No    Drug use: No

## 2025-05-29 ENCOUNTER — RESULTS FOLLOW-UP (OUTPATIENT)
Dept: FAMILY MEDICINE | Facility: CLINIC | Age: 70
End: 2025-05-29

## 2025-05-29 NOTE — PROGRESS NOTES
Subjective:       Patient ID: Kelin Keller is a 70 y.o. female.    Chief Complaint: Cough    History of Present Illness    CHIEF COMPLAINT:  Kelin presents today for chronic cough of two months duration.    HISTORY OF PRESENT ILLNESS:  She reports a chronic dry cough that began after starting lisinopril in March. The cough persists despite discontinuing lisinopril 3 days ago. She experiences shortness of breath during coughing episodes, with symptoms worsening at night.    MEDICAL HISTORY:  She has a history of diabetes, hypertension, and kidney disease. She denies history of asthma or other lung conditions.    ALLERGIES:  She reports chronic allergy symptoms affecting ears, nose, and throat with associated itching since 2006. She currently takes Zyrtec but experiences significant throat dryness as a side effect.    CURRENT MEDICATIONS:  She takes metoprolol 25 mg and amlodipine 10 mg for blood pressure management.      ROS:  ENT: +nasal irritation, +sore throat, +dry mouth  Respiratory: +shortness of breath, +cough  Gastrointestinal: +abdominal pain, +indigestion  Allergic: +allergic reactions, +itchy nose, +ear pruritus          Objective:      Physical Exam    General: In no acute distress.  Head: Normocephalic. Non traumatic.  Eyes: PERRLA. EOMs full. Conjunctivae clear. Fundi grossly normal.  Ears: EACs clear. TMs normal.  Nose: Mucosa pink. Mucosa moist. No obstruction.  Throat: Clear. No exudates. No lesions.  Neck: Supple. No masses. No thyromegaly. No bruits.  Chest: Lungs clear. No rales. No rhonchi. No wheezes.  Heart: RRR. No murmurs. No rubs. No gallops.  Abdomen: Soft. No tenderness. No masses. BS normal.  : Normal external genitalia. No lesions. No discharge. No hernias  noted.  Back: Normal curvature. No scoliosis. No tenderness.  Extremities: Warm. Well perfused. No upper extremity edema. No lower extremity edema. FROM. No deformities. No joint erythema.  Neuro: No focal deficits appreciated.  Good muscle tone. Normal response to visual stimuli. Normal response to auditory stimuli.  Skin: Normal. No rashes. No lesions noted.  Vitals: BLOOD PRESSURE: 150/82.            Assessment:         Hypertensive kidney disease with stage 3a chronic kidney disease  -     amLODIPine (NORVASC) 5 MG tablet; Take 1 tablet (5 mg total) by mouth once daily.  Dispense: 90 tablet; Refill: 3    Type 2 diabetes mellitus with stage 3a chronic kidney disease, without long-term current use of insulin  -     dapagliflozin propanediol (FARXIGA) 5 mg Tab tablet; Take 1 tablet (5 mg total) by mouth once daily.  Dispense: 90 tablet; Refill: 3  -     Hemoglobin A1C; Future; Expected date: 06/27/2025  -     Comprehensive Metabolic Panel; Future; Expected date: 06/27/2025    Chronic cough  -     Ambulatory referral/consult to Allergy; Future; Expected date: 06/03/2025  -     X-Ray Chest PA And Lateral; Future; Expected date: 05/27/2025  -     Complete PFT w/ bronchodilator; Future    Allergic rhinitis due to other allergic trigger, unspecified seasonality  -     Ambulatory referral/consult to Allergy; Future; Expected date: 06/03/2025         Plan:       Assessment & Plan    COUGH:  - Ordered pulmonary function test and chest XR to rule out underlying lung conditions including asthma, emphysema, and other pulmonary pathologies.    ADVERSE EFFECT OF ACE INHIBITORS (LISINOPRIL):  - Monitored the patient's chronic cough for 2 months, potentially related to lisinopril use.  - Kelin continues to experience a dry cough despite stopping lisinopril 3 days ago.  - Assessed that the chronic cough is likely due to a combination of lisinopril side effect and allergies.  - Discontinued lisinopril due to suspected adverse reaction.  - Educated the patient that this dry cough affects approximately 30% of lisinopril users and may take 2-4 weeks to fully resolve after discontinuation.  - Considered angiotensin receptor blockers as alternative BP  medication but opted to delay initiation pending allergy specialist evaluation.  - Informed patient these alternatives typically do not cause cough.    TYPE 2 DIABETES WITH CHRONIC KIDNEY DISEASE:  - Monitored the patient who has diabetes and CKD  - Initiated dapagliflozin 5 mg daily for diabetes management with the added benefit of kidney protection.    ESSENTIAL HYPERTENSION:  - Monitored current blood pressure at 150/82.  - Assessed the need for BP control following lisinopril discontinuation.  - Prescribed metoprolol 25 mg and increased amlodipine to 5 mg for blood pressure management.    CHRONIC KIDNEY DISEASE:  - This condition is being addressed with the dapagliflozin 5 mg daily as noted under Type 2 Diabetes with Chronic Kidney Disease section.    ALLERGIC RHINITIS:  - Monitored chronic allergies affecting ears, nose, and throat.  - Assessed that chronic cough is likely due to combination of lisinopril side effect and allergies.  - Prescribed Zyrtec and Flonase for allergy management.  - Referred to allergy specialist for evaluation of chronic allergy symptoms.    ADVERSE EFFECT OF ANTIALLERGIC DRUGS:  - Monitored throat dryness potentially related to Zyrtec use.  - This concern will be addressed during the allergy specialist evaluation.    LONG TERM USE OF ORAL HYPOGLYCEMIC DRUGS:  - Continuing management with dapagliflozin 5 mg daily as noted above.    FOLLOW-UP:  - Return in 4 weeks to reassess cough resolution and other health issues.  - Complete labs a few days prior to next appointment.              This note was generated with the assistance of ambient listening technology. Verbal consent was obtained by the patient and accompanying visitor(s) for the recording of patient appointment to facilitate this note. I attest to having reviewed and edited the generated note for accuracy, though some syntax or spelling errors may persist. Please contact the author of this note for any clarification.

## 2025-06-03 ENCOUNTER — PATIENT MESSAGE (OUTPATIENT)
Dept: ALLERGY | Facility: CLINIC | Age: 70
End: 2025-06-03
Payer: MEDICARE

## 2025-06-06 ENCOUNTER — PATIENT MESSAGE (OUTPATIENT)
Dept: ADMINISTRATIVE | Facility: HOSPITAL | Age: 70
End: 2025-06-06
Payer: MEDICARE

## 2025-06-09 ENCOUNTER — PATIENT OUTREACH (OUTPATIENT)
Dept: ADMINISTRATIVE | Facility: HOSPITAL | Age: 70
End: 2025-06-09
Payer: MEDICARE

## 2025-06-09 VITALS — DIASTOLIC BLOOD PRESSURE: 67 MMHG | SYSTOLIC BLOOD PRESSURE: 110 MMHG

## 2025-06-10 ENCOUNTER — RESULTS FOLLOW-UP (OUTPATIENT)
Dept: FAMILY MEDICINE | Facility: CLINIC | Age: 70
End: 2025-06-10

## 2025-06-27 ENCOUNTER — HOSPITAL ENCOUNTER (OUTPATIENT)
Dept: RADIOLOGY | Facility: HOSPITAL | Age: 70
Discharge: HOME | End: 2025-06-27
Attending: FAMILY MEDICINE
Payer: MEDICARE

## 2025-06-27 ENCOUNTER — HOSPITAL ENCOUNTER (OUTPATIENT)
Dept: PULMONOLOGY | Facility: HOSPITAL | Age: 70
Discharge: HOME | End: 2025-06-27
Attending: FAMILY MEDICINE
Payer: MEDICARE

## 2025-06-27 DIAGNOSIS — Z78.0 MENOPAUSE: ICD-10-CM

## 2025-06-27 DIAGNOSIS — R05.3 CHRONIC COUGH: ICD-10-CM

## 2025-06-27 PROCEDURE — 77080 DXA BONE DENSITY AXIAL: CPT | Mod: 26,,, | Performed by: INTERNAL MEDICINE

## 2025-06-27 PROCEDURE — 77080 DXA BONE DENSITY AXIAL: CPT | Mod: TC

## 2025-06-28 ENCOUNTER — OFFICE VISIT (OUTPATIENT)
Dept: URGENT CARE | Facility: CLINIC | Age: 70
End: 2025-06-28
Payer: MEDICARE

## 2025-06-28 VITALS
HEART RATE: 121 BPM | OXYGEN SATURATION: 95 % | DIASTOLIC BLOOD PRESSURE: 75 MMHG | SYSTOLIC BLOOD PRESSURE: 132 MMHG | BODY MASS INDEX: 26.47 KG/M2 | HEIGHT: 61 IN | TEMPERATURE: 99 F | WEIGHT: 140.19 LBS | RESPIRATION RATE: 17 BRPM

## 2025-06-28 DIAGNOSIS — R19.7 DIARRHEA, UNSPECIFIED TYPE: ICD-10-CM

## 2025-06-28 DIAGNOSIS — R10.9 ABDOMINAL CRAMPING: ICD-10-CM

## 2025-06-28 DIAGNOSIS — R50.9 FEVER, UNSPECIFIED FEVER CAUSE: ICD-10-CM

## 2025-06-28 DIAGNOSIS — A09 TRAVELER'S DIARRHEA: Primary | ICD-10-CM

## 2025-06-28 LAB
CTP QC/QA: YES
CTP QC/QA: YES
POC MOLECULAR INFLUENZA A AGN: NEGATIVE
POC MOLECULAR INFLUENZA B AGN: NEGATIVE
SARS-COV+SARS-COV-2 AG RESP QL IA.RAPID: NEGATIVE

## 2025-06-28 PROCEDURE — 87502 INFLUENZA DNA AMP PROBE: CPT | Mod: QW,S$GLB,,

## 2025-06-28 PROCEDURE — 99214 OFFICE O/P EST MOD 30 MIN: CPT | Mod: S$GLB,,,

## 2025-06-28 PROCEDURE — 87811 SARS-COV-2 COVID19 W/OPTIC: CPT | Mod: QW,S$GLB,,

## 2025-06-28 RX ORDER — DICYCLOMINE HYDROCHLORIDE 20 MG/1
20 TABLET ORAL 2 TIMES DAILY
Qty: 15 TABLET | Refills: 0 | Status: SHIPPED | OUTPATIENT
Start: 2025-06-28 | End: 2025-07-06

## 2025-06-28 RX ORDER — AZITHROMYCIN 500 MG/1
500 TABLET, FILM COATED ORAL DAILY
Qty: 3 TABLET | Refills: 0 | Status: SHIPPED | OUTPATIENT
Start: 2025-06-28 | End: 2025-07-01

## 2025-06-28 NOTE — PATIENT INSTRUCTIONS
Diarrhea: Azithromycin once daily for 3 days   Abdominal pain: Bentyl every 4-6 hours as needed  Increase fluid intake  Please return here or go to the Emergency Department for any concerns or worsening of condition.  If you were prescribed antibiotics, please take them to completion.  If you were prescribed a narcotic medication, do not drive or operate heavy equipment or machinery while taking these medications.  Please follow up with your primary care doctor or specialist as needed.    If you  smoke, please stop smoking.

## 2025-06-28 NOTE — PROGRESS NOTES
"Subjective:      Patient ID: Kelin Keller is a 70 y.o. female.    Vitals:  height is 5' 1" (1.549 m) and weight is 63.6 kg (140 lb 3.4 oz). Her oral temperature is 99.4 °F (37.4 °C). Her blood pressure is 132/75 and her pulse is 121 (abnormal). Her respiration is 17 and oxygen saturation is 95%.     Chief Complaint: Headache    Pt is a 70 y.o. female with PMHx of DMT2. HTN, HLD, migraines. She is presenting with watery diarrhea, HA, chills, ABD pain, fever.  Onset of symptoms was 3 days ago after returning from [place]. Denies any CP, SOB, vomiting, congestion, otalgia, sore throat.  Pt reports using OTC Tylenol with mild relief.    Headache   This is a new problem. The current episode started in the past 7 days. The problem occurs constantly. The problem has been unchanged. The pain does not radiate. The pain quality is not similar to prior headaches. The quality of the pain is described as aching. The pain is at a severity of 8/10. The patient is experiencing no pain. Associated symptoms include abdominal pain and a fever. Pertinent negatives include no abnormal behavior, anorexia, back pain, blurred vision, coughing, dizziness, drainage, ear pain, eye pain, eye redness, eye watering, facial sweating, hearing loss, insomnia, loss of balance, muscle aches, nausea, neck pain, numbness, phonophobia, photophobia, rhinorrhea, scalp tenderness, seizures, sinus pressure, sore throat, swollen glands, tingling, tinnitus, visual change, vomiting, weakness or weight loss. Nothing aggravates the symptoms. She has tried acetaminophen for the symptoms. The treatment provided mild relief. Her past medical history is significant for migraine headaches. There is no history of cancer, cluster headaches, hypertension, immunosuppression, migraines in the family, obesity, pseudotumor cerebri, recent head traumas, sinus disease or TMJ.       Constitution: Positive for chills and fever. Negative for activity change and appetite " change.   HENT:  Negative for ear pain, tinnitus, hearing loss, congestion, postnasal drip, sinus pain, sinus pressure and sore throat.    Neck: Negative for neck pain.   Cardiovascular:  Negative for chest pain and sob on exertion.   Eyes:  Negative for eye trauma, eye discharge, eye itching, eye pain, eye redness, photophobia and blurred vision.   Respiratory:  Negative for cough, shortness of breath, wheezing and asthma.    Gastrointestinal:  Positive for abdominal pain and diarrhea. Negative for nausea, vomiting and constipation.   Genitourinary:  Negative for dysuria, frequency, urgency, urine decreased and hematuria.   Musculoskeletal:  Negative for pain, back pain and muscle ache.   Skin:  Negative for color change, rash and hives.   Allergic/Immunologic: Negative for seasonal allergies, asthma, hives and sneezing.   Neurological:  Positive for headaches and history of migraines. Negative for dizziness, light-headedness, loss of balance, altered mental status, numbness and seizures.   Psychiatric/Behavioral:  Negative for altered mental status and confusion. The patient does not have insomnia.       Objective:     Physical Exam   Constitutional: She is oriented to person, place, and time. She appears well-developed. She is cooperative.  Non-toxic appearance. She does not appear ill. No distress.      Comments:Pt sitting erect on examination table. No acute respiratory distress, no use of accessory muscles, no notice of nasal flaring.        HENT:   Head: Normocephalic and atraumatic.   Ears:   Right Ear: Hearing and external ear normal.   Left Ear: Hearing and external ear normal.   Nose: Nose normal. No mucosal edema, rhinorrhea or nasal deformity. No epistaxis. Right sinus exhibits no maxillary sinus tenderness and no frontal sinus tenderness. Left sinus exhibits no maxillary sinus tenderness and no frontal sinus tenderness.   Mouth/Throat: Uvula is midline, oropharynx is clear and moist and mucous  membranes are normal. No trismus in the jaw. Normal dentition. No uvula swelling. No oropharyngeal exudate, posterior oropharyngeal edema or posterior oropharyngeal erythema.   Eyes: Conjunctivae and lids are normal. No scleral icterus.   Neck: Trachea normal and phonation normal. Neck supple. No edema present. No erythema present. No neck rigidity present.   Cardiovascular: Normal rate, regular rhythm, normal heart sounds and normal pulses.   Pulmonary/Chest: Effort normal and breath sounds normal. No respiratory distress. She has no decreased breath sounds. She has no rhonchi.   Abdominal: Normal appearance. There is generalized abdominal tenderness.   Musculoskeletal: Normal range of motion.         General: No deformity. Normal range of motion.   Neurological: She is alert and oriented to person, place, and time. She exhibits normal muscle tone. Coordination normal.   Skin: Skin is warm, dry, intact, not diaphoretic and not pale.   Psychiatric: Her speech is normal and behavior is normal. Judgment and thought content normal.   Nursing note and vitals reviewed.    Results for orders placed or performed in visit on 06/28/25   POCT Influenza A/B MOLECULAR    Collection Time: 06/28/25  9:56 AM   Result Value Ref Range    POC Molecular Influenza A Ag Negative Negative    POC Molecular Influenza B Ag Negative Negative     Acceptable Yes    SARS Coronavirus 2 Antigen, POCT Manual Read    Collection Time: 06/28/25 10:20 AM   Result Value Ref Range    SARS Coronavirus 2 Antigen Negative Negative, Presumptive Negative     Acceptable Yes          Assessment:     1. Traveler's diarrhea    2. Fever, unspecified fever cause    3. Diarrhea, unspecified type    4. Abdominal cramping        Plan:     I have reviewed the patient chart and pertinent past imaging/labs.    Traveler's diarrhea  -     azithromycin (ZITHROMAX) 500 MG tablet; Take 1 tablet (500 mg total) by mouth once daily. for 3 days   Dispense: 3 tablet; Refill: 0    Fever, unspecified fever cause  -     POCT Influenza A/B MOLECULAR  -     SARS Coronavirus 2 Antigen, POCT Manual Read    Diarrhea, unspecified type    Abdominal cramping  -     dicyclomine (BENTYL) 20 mg tablet; Take 1 tablet (20 mg total) by mouth 2 (two) times daily. for 15 doses  Dispense: 15 tablet; Refill: 0

## 2025-07-09 ENCOUNTER — LAB VISIT (OUTPATIENT)
Dept: LAB | Facility: HOSPITAL | Age: 70
End: 2025-07-09
Attending: FAMILY MEDICINE
Payer: MEDICARE

## 2025-07-09 DIAGNOSIS — N18.31 TYPE 2 DIABETES MELLITUS WITH STAGE 3A CHRONIC KIDNEY DISEASE, WITHOUT LONG-TERM CURRENT USE OF INSULIN: ICD-10-CM

## 2025-07-09 DIAGNOSIS — E11.22 TYPE 2 DIABETES MELLITUS WITH STAGE 3A CHRONIC KIDNEY DISEASE, WITHOUT LONG-TERM CURRENT USE OF INSULIN: ICD-10-CM

## 2025-07-09 LAB
ALBUMIN SERPL BCP-MCNC: 4.1 G/DL (ref 3.5–5.2)
ALP SERPL-CCNC: 109 UNIT/L (ref 40–150)
ALT SERPL W/O P-5'-P-CCNC: 18 UNIT/L (ref 10–44)
ANION GAP (OHS): 10 MMOL/L (ref 8–16)
AST SERPL-CCNC: 19 UNIT/L (ref 11–45)
BILIRUB SERPL-MCNC: 0.6 MG/DL (ref 0.1–1)
BUN SERPL-MCNC: 19 MG/DL (ref 8–23)
CALCIUM SERPL-MCNC: 9.9 MG/DL (ref 8.7–10.5)
CHLORIDE SERPL-SCNC: 100 MMOL/L (ref 95–110)
CO2 SERPL-SCNC: 25 MMOL/L (ref 23–29)
CREAT SERPL-MCNC: 1 MG/DL (ref 0.5–1.4)
EAG (OHS): 128 MG/DL (ref 68–131)
GFR SERPLBLD CREATININE-BSD FMLA CKD-EPI: 60 ML/MIN/1.73/M2
GLUCOSE SERPL-MCNC: 135 MG/DL (ref 70–110)
HBA1C MFR BLD: 6.1 % (ref 4–5.6)
POTASSIUM SERPL-SCNC: 4.5 MMOL/L (ref 3.5–5.1)
PROT SERPL-MCNC: 8.1 GM/DL (ref 6–8.4)
SODIUM SERPL-SCNC: 135 MMOL/L (ref 136–145)

## 2025-07-09 PROCEDURE — 36415 COLL VENOUS BLD VENIPUNCTURE: CPT | Mod: PO

## 2025-07-09 PROCEDURE — 83036 HEMOGLOBIN GLYCOSYLATED A1C: CPT

## 2025-07-09 PROCEDURE — 80053 COMPREHEN METABOLIC PANEL: CPT

## 2025-07-16 ENCOUNTER — OFFICE VISIT (OUTPATIENT)
Dept: FAMILY MEDICINE | Facility: CLINIC | Age: 70
End: 2025-07-16
Payer: MEDICARE

## 2025-07-16 VITALS
OXYGEN SATURATION: 97 % | BODY MASS INDEX: 26.6 KG/M2 | SYSTOLIC BLOOD PRESSURE: 130 MMHG | HEIGHT: 61 IN | HEART RATE: 94 BPM | WEIGHT: 140.88 LBS | DIASTOLIC BLOOD PRESSURE: 66 MMHG

## 2025-07-16 DIAGNOSIS — N18.31 TYPE 2 DIABETES MELLITUS WITH STAGE 3A CHRONIC KIDNEY DISEASE, WITHOUT LONG-TERM CURRENT USE OF INSULIN: ICD-10-CM

## 2025-07-16 DIAGNOSIS — I10 PRIMARY HYPERTENSION: Primary | ICD-10-CM

## 2025-07-16 DIAGNOSIS — M81.0 AGE-RELATED OSTEOPOROSIS WITHOUT CURRENT PATHOLOGICAL FRACTURE: ICD-10-CM

## 2025-07-16 DIAGNOSIS — E78.49 OTHER HYPERLIPIDEMIA: ICD-10-CM

## 2025-07-16 DIAGNOSIS — E11.22 TYPE 2 DIABETES MELLITUS WITH STAGE 3A CHRONIC KIDNEY DISEASE, WITHOUT LONG-TERM CURRENT USE OF INSULIN: ICD-10-CM

## 2025-07-16 PROCEDURE — 2023F DILAT RTA XM W/O RTNOPTHY: CPT | Mod: CPTII,S$GLB,, | Performed by: FAMILY MEDICINE

## 2025-07-16 PROCEDURE — 1159F MED LIST DOCD IN RCRD: CPT | Mod: CPTII,S$GLB,, | Performed by: FAMILY MEDICINE

## 2025-07-16 PROCEDURE — 3044F HG A1C LEVEL LT 7.0%: CPT | Mod: CPTII,S$GLB,, | Performed by: FAMILY MEDICINE

## 2025-07-16 PROCEDURE — 3008F BODY MASS INDEX DOCD: CPT | Mod: CPTII,S$GLB,, | Performed by: FAMILY MEDICINE

## 2025-07-16 PROCEDURE — 3078F DIAST BP <80 MM HG: CPT | Mod: CPTII,S$GLB,, | Performed by: FAMILY MEDICINE

## 2025-07-16 PROCEDURE — 3288F FALL RISK ASSESSMENT DOCD: CPT | Mod: CPTII,S$GLB,, | Performed by: FAMILY MEDICINE

## 2025-07-16 PROCEDURE — 1126F AMNT PAIN NOTED NONE PRSNT: CPT | Mod: CPTII,S$GLB,, | Performed by: FAMILY MEDICINE

## 2025-07-16 PROCEDURE — 99214 OFFICE O/P EST MOD 30 MIN: CPT | Mod: S$GLB,,, | Performed by: FAMILY MEDICINE

## 2025-07-16 PROCEDURE — 99999 PR PBB SHADOW E&M-EST. PATIENT-LVL V: CPT | Mod: PBBFAC,,, | Performed by: FAMILY MEDICINE

## 2025-07-16 PROCEDURE — 3075F SYST BP GE 130 - 139MM HG: CPT | Mod: CPTII,S$GLB,, | Performed by: FAMILY MEDICINE

## 2025-07-16 PROCEDURE — 4010F ACE/ARB THERAPY RXD/TAKEN: CPT | Mod: CPTII,S$GLB,, | Performed by: FAMILY MEDICINE

## 2025-07-16 PROCEDURE — 1101F PT FALLS ASSESS-DOCD LE1/YR: CPT | Mod: CPTII,S$GLB,, | Performed by: FAMILY MEDICINE

## 2025-07-16 PROCEDURE — 1160F RVW MEDS BY RX/DR IN RCRD: CPT | Mod: CPTII,S$GLB,, | Performed by: FAMILY MEDICINE

## 2025-07-16 RX ORDER — IBANDRONATE SODIUM 150 MG/1
150 TABLET, FILM COATED ORAL
Qty: 3 TABLET | Refills: 3 | Status: SHIPPED | OUTPATIENT
Start: 2025-07-16 | End: 2026-07-16

## 2025-07-16 RX ORDER — ATORVASTATIN CALCIUM 20 MG/1
20 TABLET, FILM COATED ORAL DAILY
Qty: 90 TABLET | Refills: 3 | Status: SHIPPED | OUTPATIENT
Start: 2025-07-16

## 2025-07-16 RX ORDER — NATEGLINIDE 60 MG/1
60 TABLET ORAL
Qty: 270 TABLET | Refills: 3 | Status: SHIPPED | OUTPATIENT
Start: 2025-07-16

## 2025-07-17 NOTE — PROGRESS NOTES
Subjective:       Patient ID: Kelin Keller is a 70 y.o. female.    Chief Complaint: Hypertension    History of Present Illness    CHIEF COMPLAINT:  Kelin presents today for follow up of chronic conditions including hypertension, diabetes, and chronic cough.    RECENT ILLNESS:  She recently visited the emergency room for a viral illness with symptoms including fever, diarrhea, and headache. She was prescribed antibiotics during the visit.    RESPIRATORY:  Her chronic cough has completely resolved following consultation with an allergy specialist on May 28. The cough was attributed to lisinopril, and she was prescribed Astelin nasal spray. The specialist advised waiting one month for the medication to clear from her system.    HYPERTENSION:  Her home blood pressure readings have been consistently well-controlled with recent measurements of 118/72, 107/69, and 105/74. She continues Amlodipine 5 mg and Metoprolol 25 mg with good adherence and effectiveness.    DIABETES:  Her diabetes is well-controlled with a recent hemoglobin A1c of 6.1 and glucose of 135 on July 9. She continues Glimepiride 2 mg, Metformin 500 mg twice daily, Tradjenta 5 mg, and Nateglinide 60 mg twice daily. She discontinued Farxiga after experiencing significant itching for two days. She denies any current diabetes-related symptoms or complications.    CHRONIC KIDNEY DISEASE:  She has stable chronic kidney disease stage 3A without new symptoms or concerns.    OSTEOPOROSIS:  She acknowledges bone weakness and understands the need for calcium supplementation at 1200 mg daily and vitamin D supplementation between 7521-5584 units daily to support bone strength. Discussed starting Ibandronate 150 mg each month. Pt advised to take this medication on an empty stomach with 8 ounces of water and avoid lying down, eating or taking other medications within 30 minutes of taking Ibandronate.      ROS:  Constitutional: +fevers  Head: +headaches  Eyes: +eye  pain  Respiratory: -cough  Gastrointestinal: +diarrhea  Integumentary: +itching          Objective:      Physical Exam    General: In no acute distress.  Head: Normocephalic. Non traumatic.  Eyes:  EOMs full. Conjunctivae clear.   Neck: Supple. No masses. No thyromegaly. No bruits.  Chest: Lungs clear. No rales. No rhonchi. No wheezes.  Heart: RRR. No murmurs. No rubs. No gallops.  Abdomen: Soft. No tenderness. No masses. BS normal.  Extremities: Warm. Well perfused. No upper extremity edema. No lower extremity edema.   Neuro: No focal deficits appreciated. Good muscle tone. Normal response to visual stimuli. Normal response to auditory stimuli.  Skin: Normal. No rashes. No lesions noted.            Assessment:         Primary hypertension    Type 2 diabetes mellitus with stage 3a chronic kidney disease, without long-term current use of insulin  -     nateglinide (STARLIX) 60 MG tablet; Take 1 tablet (60 mg total) by mouth 3 (three) times daily before meals.  Dispense: 270 tablet; Refill: 3  -     Comprehensive Metabolic Panel; Future; Expected date: 01/16/2026  -     Hemoglobin A1C; Future; Expected date: 01/16/2026  -     Microalbumin/Creatinine Ratio, Urine; Future; Expected date: 08/19/2025    Other hyperlipidemia  -     atorvastatin (LIPITOR) 20 MG tablet; Take 1 tablet (20 mg total) by mouth once daily.  Dispense: 90 tablet; Refill: 3  -     Lipid Panel; Future; Expected date: 01/16/2026    Age-related osteoporosis without current pathological fracture  -     ibandronate (BONIVA) 150 mg tablet; Take 1 tablet (150 mg total) by mouth every 30 days.  Dispense: 3 tablet; Refill: 3         Plan:       Assessment & Plan    TYPE 2 DIABETES MELLITUS:  - Diabetes is currently well-controlled with recent hemoglobin A1c at 6.1% and day glucose level at 135 (July 9), improved from previously uncontrolled status.  - Continuing current medication regimen: glimepiride 2 mg daily, metformin 500 mg twice daily, Tradjenta 5 mg  daily, and Nateglinide 60 mg 3 times daily.  - Documented long-term use of these oral hypoglycemic medications.    ESSENTIAL HYPERTENSION:  - Blood pressure is well-controlled with current antihypertensive regimen.  - Recent readings: today 118/72, yesterday 107/69, Monday 105/74.  - Continuing Amlodipine 5 mg and Metoprolol 25 mg for management.    CHRONIC KIDNEY DISEASE, STAGE 3A:  - Kidney function remains stable at stage 3A.  - Ordered urine test next month to check for proteinuria.  - Educated patient about importance of renal protection.  - Will reassess kidney function with labs in 6 months.    AGE-RELATED OSTEOPOROSIS:  - Diagnosed osteoporosis based on recent DEXA scan showing significant bone weakness.  - Prescribed ibandronate 150 mg once monthly with calcium 1200 mg daily and vitamin D 9701-0457 units daily supplementation.  - Emphasized importance of regular exercise, particularly walking and resistance training, for bone strength.  - Scheduled repeat DEXA scan in 2 years.    VIRAL INFECTION:  - Kelin recently visited emergency department for viral symptoms including fever, diarrhea, and headache.  - Prescribed antibiotics for symptom management.    HYPONATREMIA:  - Sodium level slightly low at 135 mmol/L (normal range begins at 136 mmol/L).  - No intervention required at this time.  - Will reassess electrolyte status with labs in 6 months.    ADVERSE DRUG REACTION:  - Identified and documented intolerance to dapagliflozin (Farxiga) due to significant pruritus.  - Medication has been discontinued and added to patient's allergen list.    OTHER:  - Chronic cough has resolved following allergy specialist consultation and medication adjustment.    FOLLOW-UP:  - Scheduled comprehensive follow up in 6 months with labs a few days prior.  - Additional follow-up next month for urine test to assess proteinuria.              This note was generated with the assistance of ambient listening technology. Verbal  consent was obtained by the patient and accompanying visitor(s) for the recording of patient appointment to facilitate this note. I attest to having reviewed and edited the generated note for accuracy, though some syntax or spelling errors may persist. Please contact the author of this note for any clarification.

## 2025-07-31 DIAGNOSIS — K21.9 GASTROESOPHAGEAL REFLUX DISEASE WITHOUT ESOPHAGITIS: ICD-10-CM

## 2025-07-31 RX ORDER — OMEPRAZOLE 40 MG/1
40 CAPSULE, DELAYED RELEASE ORAL DAILY
Qty: 90 CAPSULE | Refills: 3 | Status: SHIPPED | OUTPATIENT
Start: 2025-07-31 | End: 2026-07-31

## 2025-07-31 NOTE — TELEPHONE ENCOUNTER
Care Due:                  Date            Visit Type   Department     Provider  --------------------------------------------------------------------------------                                EP -                              PRIMARY      [facility]  Last Visit: 07-      CARE ([facility])   BIJU Rodriguez                              EP -                              [facility]  Next Visit: 01-      CARE ([facility])   MEDICINE       Lyndsey Rodriguez                                                            Last  Test          Frequency    Reason                     Performed    Due Date  --------------------------------------------------------------------------------    Mg Level....  12 months..  ibandronate..............  Not Found    Overdue    Phosphate...  12 months..  ibandronate..............  Not Found    Overdue    [employer] Embedded Care Due Messages. Reference number: [id number].   7/31/2025 12:20:15 PM CDT

## 2025-07-31 NOTE — TELEPHONE ENCOUNTER
Refill Routing Note   Medication(s) are not appropriate for processing by [facility] for the following reason(s):        No active prescription written by provider    [facility] action(s):  Defer               Appointments  past 12m or future 3m with PCP    Date Provider   Last Visit   7/16/2025 Lyndsey Rdoriguez A.M., MD   Next Visit   1/20/2026 Lyndsey Rodriguez A.M., MD   ED visits in past 90 days: 0        Note composed:4:06 PM 07/31/2025

## 2025-08-04 ENCOUNTER — OFFICE VISIT (OUTPATIENT)
Dept: ALLERGY | Facility: CLINIC | Age: 70
End: 2025-08-04
Payer: MEDICARE

## 2025-08-04 VITALS
WEIGHT: 140.44 LBS | DIASTOLIC BLOOD PRESSURE: 81 MMHG | BODY MASS INDEX: 26.51 KG/M2 | OXYGEN SATURATION: 98 % | SYSTOLIC BLOOD PRESSURE: 125 MMHG | HEART RATE: 92 BPM | HEIGHT: 61 IN

## 2025-08-04 DIAGNOSIS — T88.7XXA MEDICATION SIDE EFFECT: ICD-10-CM

## 2025-08-04 DIAGNOSIS — Z87.898 HISTORY OF CHRONIC COUGH: ICD-10-CM

## 2025-08-04 DIAGNOSIS — H10.10 ALLERGIC CONJUNCTIVITIS, UNSPECIFIED LATERALITY: ICD-10-CM

## 2025-08-04 DIAGNOSIS — J30.9 CHRONIC ALLERGIC RHINITIS: Primary | ICD-10-CM

## 2025-08-04 PROCEDURE — 99999 PR PBB SHADOW E&M-EST. PATIENT-LVL IV: CPT | Mod: PBBFAC,,, | Performed by: STUDENT IN AN ORGANIZED HEALTH CARE EDUCATION/TRAINING PROGRAM

## 2025-08-04 PROCEDURE — 3044F HG A1C LEVEL LT 7.0%: CPT | Mod: CPTII,S$GLB,, | Performed by: STUDENT IN AN ORGANIZED HEALTH CARE EDUCATION/TRAINING PROGRAM

## 2025-08-04 PROCEDURE — 3008F BODY MASS INDEX DOCD: CPT | Mod: CPTII,S$GLB,, | Performed by: STUDENT IN AN ORGANIZED HEALTH CARE EDUCATION/TRAINING PROGRAM

## 2025-08-04 PROCEDURE — 1160F RVW MEDS BY RX/DR IN RCRD: CPT | Mod: CPTII,S$GLB,, | Performed by: STUDENT IN AN ORGANIZED HEALTH CARE EDUCATION/TRAINING PROGRAM

## 2025-08-04 PROCEDURE — 3079F DIAST BP 80-89 MM HG: CPT | Mod: CPTII,S$GLB,, | Performed by: STUDENT IN AN ORGANIZED HEALTH CARE EDUCATION/TRAINING PROGRAM

## 2025-08-04 PROCEDURE — 99214 OFFICE O/P EST MOD 30 MIN: CPT | Mod: S$GLB,,, | Performed by: STUDENT IN AN ORGANIZED HEALTH CARE EDUCATION/TRAINING PROGRAM

## 2025-08-04 PROCEDURE — 1126F AMNT PAIN NOTED NONE PRSNT: CPT | Mod: CPTII,S$GLB,, | Performed by: STUDENT IN AN ORGANIZED HEALTH CARE EDUCATION/TRAINING PROGRAM

## 2025-08-04 PROCEDURE — 1159F MED LIST DOCD IN RCRD: CPT | Mod: CPTII,S$GLB,, | Performed by: STUDENT IN AN ORGANIZED HEALTH CARE EDUCATION/TRAINING PROGRAM

## 2025-08-04 PROCEDURE — 4010F ACE/ARB THERAPY RXD/TAKEN: CPT | Mod: CPTII,S$GLB,, | Performed by: STUDENT IN AN ORGANIZED HEALTH CARE EDUCATION/TRAINING PROGRAM

## 2025-08-04 PROCEDURE — 3074F SYST BP LT 130 MM HG: CPT | Mod: CPTII,S$GLB,, | Performed by: STUDENT IN AN ORGANIZED HEALTH CARE EDUCATION/TRAINING PROGRAM

## 2025-08-04 RX ORDER — IPRATROPIUM BROMIDE 42 UG/1
2 SPRAY, METERED NASAL 3 TIMES DAILY PRN
Qty: 15 ML | Refills: 11 | Status: SHIPPED | OUTPATIENT
Start: 2025-08-04

## 2025-08-04 NOTE — PROGRESS NOTES
ALLERGY & IMMUNOLOGY CLINIC - FOLLOW UP     HISTORY OF PRESENT ILLNESS     Patient ID: Kelin Keller is a 70 y.o. female    CC: allergic rhinoconjunctivitis     HPI: Kelin Keller is a 70 y.o. female with DM2, HTN, CKD, and ACEi-induced cough, following up for allergic rhinoconjunctivitis.    Cough doing much better. Improved after stopping lisinopril.   But now she is having post-nasal drip that is causing throat-clearing cough.   She says she stopped taking zyrtec. She thinks it caused a pain in her throat and caused a cough at night.   She stopped the xyzal because it wasn't helping anymore.   She says she was given singulair too, but it didn't help.   She says the azelastine nasal spray is helpful. It has a bad taste and sensation, but she says it works. She is using it BID.   She isn't currently on an oral medication for these symptoms.  Her symptoms are worse at night. She says around 1 or 2 in afternoon, nose gets itchy, watery eyes.   She reports she has allergen covers on her mattress and pillows. She also has an air purifier.   She says she initially found the pataday eye drops helpful, but now not as much.      MEDICAL HISTORY     Vaccines:   Immunization History   Administered Date(s) Administered    COVID-19 MRNA, LN-S PF (MODERNA HALF 0.25 ML DOSE) 12/07/2021    COVID-19, MRNA, LN-S, PF (MODERNA FULL 0.5 ML DOSE) 02/25/2021, 03/25/2021, 04/09/2021, 06/01/2021    Influenza (FLUAD) - Quadrivalent - Adjuvanted - PF *Preferred* (65+) 10/02/2023, 10/06/2023    Influenza - Quadrivalent - MDCK - PF 11/16/2022    Influenza - Trivalent - Afluria, Fluzone MDV 10/28/2010, 01/21/2014, 09/23/2020, 12/22/2020, 09/27/2021, 10/14/2021, 09/20/2022    Influenza - Trivalent - Fluad - Adjuvanted - PF (65 years and older 10/14/2024    Influenza - Trivalent - Fluarix, Flulaval, Fluzone, Afluria - PF 02/18/2011, 09/26/2016    Influenza - Trivalent - Flucelvax - PF 09/26/2024    Influenza - Trivalent - Fluzone High Dose -  "PF (65 years and older) 09/29/2019    Influenza A (H1N1) 2009 Monovalent - IM 02/01/2010    Pneumococcal Conjugate - 20 Valent 03/14/2025    Pneumococcal Polysaccharide - 23 Valent 02/18/2011    Tdap 05/20/2018    Zoster Recombinant 04/02/2018, 07/17/2018     Medical Hx:   Problem List[1]    Surgical Hx:   Past Surgical History:   Procedure Laterality Date    BLEPHAROPLASTY, UPPER EYELID Bilateral 11/11/2022    Procedure: BLEPHAROPLASTY, UPPER EYELID;  Surgeon: Roseanna Larry MD;  Location: [facility] OR [address] [place] [address];  Service: Ophthalmology;  Laterality: Bilateral;    CHOLECYSTECTOMY  1980    EXCISION OF LESION OF EYELID Left 11/11/2022    Procedure: EXCISION, LESION, EYELID;  Surgeon: Roseanna Larry MD;  Location: [facility] OR [address] [place] [address];  Service: Ophthalmology;  Laterality: Left;    EXPLORATION OF ORBIT Bilateral 11/11/2022    Procedure: EXPLORATION, ORBIT;  Surgeon: Roseanna Larry MD;  Location: [facility] OR [address] [place] [address];  Service: Ophthalmology;  Laterality: Bilateral;    SIGMOIDECTOMY  2010     Medications:   Medications Ordered Prior to Encounter[2]    Drug Allergies:   Review of patient's allergies indicates:   Allergen Reactions    Farxiga [dapagliflozin] Itching    Iodinated contrast media Swelling     throat    Iodine Swelling     Difficulty swallowing    Januvia [sitagliptin] Shortness Of Breath    Lisinopril Other (See Comments)     Cough     Social Hx:   Social History[3]    Additional History Obtained at Initial Visit:  H/o Asthma: denies  H/o Rhinitis: endorses  Env/Occ:   Pets: dog, doesn't seem to trigger symptoms   Occupation: retired (cleaned houses for 33 years)     PHYSICAL EXAM     VS: /81 (BP Location: Right arm, Patient Position: Sitting)   Pulse 92   Ht 5' 1" (1.549 m)   Wt 63.7 kg (140 lb 6.9 oz)   LMP 01/01/2006   SpO2 98%   BMI 26.53 kg/m²   GENERAL: Alert, NAD, well-appearing  EYES: EOMI, no conjunctival injection, no discharge, no infraorbital shiners  NOSE: NT 2-3+ B/L, no stringing mucus, no polyps " visualized  ORAL: MMM, no ulcers, no thrush  LUNGS: CTAB, no w/r/c, no increased WOB  HEART: RRR, normal S1/S2, no m/g/r  DERM: no rashes     LABORATORY STUDIES     Component      Latest Ref Rng 3/12/2025 7/9/2025   Sodium      136 - 145 mmol/L 134 (L)  135 (L)    Potassium      3.5 - 5.1 mmol/L 4.5  4.5    Chloride      95 - 110 mmol/L 101  100    CO2      23 - 29 mmol/L 23  25    Glucose      70 - 110 mg/dL 95  135 (H)    BUN      8 - 23 mg/dL 16  19    Creatinine      0.5 - 1.4 mg/dL 0.9  1.0    Calcium      8.7 - 10.5 mg/dL 9.5  9.9    PROTEIN TOTAL      6.0 - 8.4 gm/dL 7.8  8.1    Albumin      3.5 - 5.2 g/dL 3.9  4.1    BILIRUBIN TOTAL      0.1 - 1.0 mg/dL 0.5  0.6    ALP      40 - 150 unit/L 125  109    AST      11 - 45 unit/L 17  19    ALT      10 - 44 unit/L 18  18    eGFR      >60 mL/min/1.73/m2 >60.0  60 (L)    Anion Gap      8 - 16 mmol/L 10  10    Hemoglobin A1C External      4.0 - 5.6 % 5.9 (H)  6.1 (H)      Component      Latest Ref Rng 11/5/2024 3/12/2025   WBC      3.90 - 12.70 K/uL 9.44  8.31    RBC      4.00 - 5.40 M/uL 3.90 (L)  3.70 (L)    Hemoglobin      12.0 - 16.0 g/dL 12.8  12.0    Hematocrit      37.0 - 48.5 % 37.4  35.1 (L)    MCV      82 - 98 fL 96  95    MCH      27.0 - 31.0 pg 32.8 (H)  32.4 (H)    MCHC      32.0 - 36.0 g/dL 34.2  34.2    RDW      11.5 - 14.5 % 12.4  12.0    Platelet Count      150 - 450 K/uL 193  222    MPV      9.2 - 12.9 fL 10.6  10.3    Immature Granulocytes      0.0 - 0.5 % 0.3  0.4    Gran # (ANC)      1.8 - 7.7 K/uL 6.2  4.6    Immature Grans (Abs)      0.00 - 0.04 K/uL 0.03  0.03    Lymph #      1.0 - 4.8 K/uL 2.2  2.5    Mono #      0.3 - 1.0 K/uL 0.8  0.8    Eos #      0.0 - 0.5 K/uL 0.2  0.3    Baso #      0.00 - 0.20 K/uL 0.07  0.08    Differential Method Automated  Automated       ALLERGEN TESTING     Immunocaps:   Component      Latest Ref [facility] 5/28/2025   Dermatophagoides farinae, IgE      <0.10 kU/L <0.10    Dermatophagoides farinae Class CLASS 0     Dermatophagoides pteronyssinus, IgE      <0.10 kU/L 0.15 (H)    Dermatophagoides pteronyssinus Class CLASS 0/1    [place] Grass, IgE      <0.10 kU/L <0.10    [place] Grass, Class CLASS 0    [place] Grass, IgE      <0.10 kU/L <0.10    [place] Grass, Class CLASS 0    Cedar, IgE      <0.10 kU/L <0.10    [place], Class CLASS 0    English Plantain, Ribwort, IgE      <0.10 kU/L <0.10    English Plantain, Ribwort, Class CLASS 0    Oak, IgE      <0.10 kU/L <0.10    [place], Class CLASS 0    Pecan, Hickory, IgE      <0.10 kU/L <0.10    Pecan, [place]mond[place], Class CLASS 0    Rough Marshelder, IgE      <0.10 kU/L <0.10    Rough Marshelder, Class CLASS 0    Western Ragweed, IgE      <0.10 kU/L <0.10    Western Ragweed, Class CLASS 0    Alternaria alternata, Class CLASS 0    Alternaria alternata, IgE      <0.10 kU/L <0.10    Aspergillus fumigatus, IgE      <0.10 kU/L <0.10    Aspergillus fumigatus, Class CLASS 0    Cat Dander, IgE      <0.10 kU/L <0.10    Cat Dander, Class CLASS 0    Dog Dander, IgE      <0.10 kU/L <0.10    Dog Dander, Class CLASS 0    White Tk, IgE      <0.10 kU/L <0.10    White Tk, Class CLASS 0    Maple ([place]), IgE      <0.10 kU/L <0.10    Maple ([place]), Class CLASS 0    [place], IgE      <0.10 kU/L <0.10    [place], Class CLASS 0    Sheep Sorrel, IgE      <0.10 kU/L <0.10    Sheep Sorrel, Class CLASS 0    Saltwort, Russian Thistle, IgE      <0.10 kU/L <0.10    Saltwort, Russian Thistle, Class CLASS 0    Common Pigweed, IgE      <0.10 kU/L <0.10    Common Pigweed, Class CLASS 0    [place] Grass, IgE      <0.10 kU/L <0.10    [place] Grass, Class CLASS 0    Ta Grass, IgE      <0.10 kU/L <0.10    Ta Grass, Class CLASS 0        PULMONARY FUNCTION TESTING     Date 6/27/25:  Patient unable to do maneuvers. She attempted but could only achieve a 2-3 second exhale on FVL.     IMAGING & OTHER DIAGNOSTICS     CXR 5/28/25:  FINDINGS:  Cardiac size is normal. Lungs are clear and well aerated. No infiltrate  is seen.     CHART REVIEW     Reviewed pcp note, labs.     ASSESSMENT & PLAN     Kelin Keller is a 70 y.o. female with     # Chronic allergic rhinoconjunctivitis: Immunocaps with equivocal result to dust mite; otherwise negative. I do think dust mite allergy is at least partially responsible for her symptoms. She is using dust mite avoidance measures. She didn't tolerate flonase (said it caused tonsil pain and headaches). She did not find cetirizine helpful (also says it caused throat pain). She didn't find montelukast helpful. She no longer finds levocetirizine helpful and has since stopped it. She finds azelastine nasal spray very helpful. She has been more bothered by post-nasal drip recently.   -continue azelastine nasal spray 1-2 SEN BID.   -start ipratropium 0.06% nasal spray 2 SEN TID prn for rhinorrhea or post-nasal drip.   -continue pataday eyedrops prn.  -discussed immunotherapy options. She is on a beta blocker (relative contraindication). After discussing risks, benefits, and logistics, she is not interested in immunotherapy.    # ACE inhibitor-induced cough: Cough started in 3/2025, around the time she started lisinopril. She stopped lisinopril in 5/2025, and cough significantly improved (the cough she gets now is different, attributed to post-nasal drip).  -continue avoidance of ACE inhibitors.       Follow up: 4-5 months     I spent a total of 35 minutes on the day of the visit.  This includes face to face time and non-face to face time preparing to see the patient (eg, review of tests), obtaining and/or reviewing separately obtained history, documenting clinical information in the electronic or other health record, independently interpreting results and communicating results to the patient/family/caregiver, or care coordinator.    Pricila Espitia MD  Allergy/Immunology         [1]   Patient Active Problem List  Diagnosis    Type 2 diabetes mellitus with stage 3a chronic kidney disease, without  long-term current use of insulin    Primary hypertension    Hyperlipidemia    Allergic rhinitis    Migraine without aura and without status migrainosus, not intractable    CKD stage 3a, GFR 45-59 ml/min    Chronic bilateral low back pain without sciatica    Diverticulosis of colon   [2]   Current Outpatient Medications on File Prior to Visit   Medication Sig Dispense Refill    ACCU-CHEK GUIDE GLUCOSE METER Misc PLEASE SEE ATTACHED FOR DETAILED DIRECTIONS      ACCU-CHEK GUIDE TEST STRIPS Strp 1 strip 2 (two) times daily.      amLODIPine (NORVASC) 5 MG tablet Take 1 tablet (5 mg total) by mouth once daily. 90 tablet 3    atorvastatin (LIPITOR) 20 MG tablet Take 1 tablet (20 mg total) by mouth once daily. 90 tablet 3    azelastine (ASTELIN) 137 mcg (0.1 %) nasal spray Use 1-2 sprays in each nostril twice daily 30 mL 11    diclofenac sodium (VOLTAREN ARTHRITIS PAIN) 1 % Gel Apply 2 g topically 4 (four) times daily as needed (pain). 100 g 11    glimepiride (AMARYL) 2 MG tablet Take 1 tablet (2 mg total) by mouth every morning. 90 tablet 3    ibandronate (BONIVA) 150 mg tablet Take 1 tablet (150 mg total) by mouth every 30 days. 3 tablet 3    levocetirizine (XYZAL) 5 MG tablet Take 1 tablet (5 mg total) by mouth every evening. 30 tablet 11    metFORMIN (GLUCOPHAGE-XR) 500 MG ER 24hr tablet Take 1 tablet (500 mg total) by mouth 2 (two) times daily with meals. 180 tablet 3    metoprolol succinate (TOPROL-XL) 25 MG 24 hr tablet Take 1 tablet (25 mg total) by mouth once daily. 90 tablet 3    nateglinide (STARLIX) 60 MG tablet Take 1 tablet (60 mg total) by mouth 3 (three) times daily before meals. 270 tablet 3    omeprazole (PRILOSEC) 40 MG capsule Take 1 capsule (40 mg total) by mouth once daily. 90 capsule 3    TRADJENTA 5 mg Tab tablet Take 1 tablet (5 mg total) by mouth once daily. 90 tablet 3    triamcinolone acetonide 0.1% (KENALOG) 0.1 % cream SMARTSI sparingly Topical Twice Daily PRN       butalbital-acetaminophen-caffeine -40 mg (FIORICET, ESGIC) -40 mg per tablet Take 1 tablet by mouth daily as needed for Headaches. (Patient not taking: Reported on 8/4/2025) 30 tablet 5    cetirizine (ZYRTEC) 10 MG tablet Take 1 tablet (10 mg total) by mouth once daily. (Patient not taking: Reported on 8/4/2025)      fluticasone propionate (FLONASE) 50 mcg/actuation nasal spray 1 spray (50 mcg total) by Each Nostril route 2 (two) times daily. (Patient not taking: Reported on 8/4/2025) 16 g 11    neomycin-polymyxin-dexamethasone (MAXITROL) 3.5 mg/g-10,000 unit/g-0.1 % Oint Place into both eyes every 8 (eight) hours. (Patient not taking: Reported on 8/4/2025) 3.5 g 2    polyethylene glycol (GLYCOLAX) 17 gram/dose powder Take 17 g by mouth once daily. (Patient not taking: Reported on 8/4/2025)       No current facility-administered medications on file prior to visit.   [3]   Social History  Tobacco Use    Smoking status: Never     Passive exposure: Current    Smokeless tobacco: Never   Substance Use Topics    Alcohol use: No    Drug use: No

## 2025-08-19 ENCOUNTER — LAB VISIT (OUTPATIENT)
Dept: LAB | Facility: HOSPITAL | Age: 70
End: 2025-08-19
Attending: FAMILY MEDICINE
Payer: MEDICARE

## 2025-08-19 DIAGNOSIS — E11.22 TYPE 2 DIABETES MELLITUS WITH STAGE 3A CHRONIC KIDNEY DISEASE, WITHOUT LONG-TERM CURRENT USE OF INSULIN: ICD-10-CM

## 2025-08-19 DIAGNOSIS — N18.31 TYPE 2 DIABETES MELLITUS WITH STAGE 3A CHRONIC KIDNEY DISEASE, WITHOUT LONG-TERM CURRENT USE OF INSULIN: ICD-10-CM

## 2025-08-19 LAB
ALBUMIN/CREAT UR: 7.7 UG/MG
CREAT UR-MCNC: 155 MG/DL (ref 15–325)
MICROALBUMIN UR-MCNC: 12 UG/ML

## 2025-08-19 PROCEDURE — 82570 ASSAY OF URINE CREATININE: CPT

## 2025-08-23 DIAGNOSIS — I10 PRIMARY HYPERTENSION: Primary | ICD-10-CM

## 2025-08-25 RX ORDER — METOPROLOL SUCCINATE 25 MG/1
25 TABLET, EXTENDED RELEASE ORAL DAILY
Qty: 90 TABLET | Refills: 1 | Status: SHIPPED | OUTPATIENT
Start: 2025-08-25

## 2025-08-26 RX ORDER — GLIMEPIRIDE 2 MG/1
2 TABLET ORAL
Qty: 180 TABLET | Refills: 3 | Status: SHIPPED | OUTPATIENT
Start: 2025-08-26

## (undated) DEVICE — NDL STRAIGHT 4CM LEIBINGER

## (undated) DEVICE — CLEANER TIP ELECSURG 2X2IN

## (undated) DEVICE — SOL BETADINE 5%

## (undated) DEVICE — ELECTRODE REM PLYHSV RETURN 9

## (undated) DEVICE — CORD FOR BIPOLAR FORCEPS 12

## (undated) DEVICE — SPONGE NEURO 1/4X1/4

## (undated) DEVICE — TOWEL OR DISP STRL BLUE 4/PK

## (undated) DEVICE — TUBING SUC UNIV W/CONN 12FT

## (undated) DEVICE — GAUZE SPONGE 4X4 12PLY

## (undated) DEVICE — BOWL STERILE LARGE 32OZ

## (undated) DEVICE — DRAPE STERI-DRAPE 1000 17X11IN

## (undated) DEVICE — SKINMARKER & RULER REGULAR X-F

## (undated) DEVICE — SOL 9P NACL IRR PIC IL

## (undated) DEVICE — TRAY MUSCLE LID EYE

## (undated) DEVICE — CUP MEDICINE GRADUATED 1OZ

## (undated) DEVICE — NDL 22GA X1 1/2 REG BEVEL

## (undated) DEVICE — HOOK STAY ELAS 5MM 8EA/PK

## (undated) DEVICE — DRAPE STERI INSTRUMENT 1018

## (undated) DEVICE — PENCIL ROCKER SWITCH 10FT CORD

## (undated) DEVICE — SOL BSS BALANCED SALT

## (undated) DEVICE — INSTRUMENT SURG SUCT FRZR W/C

## (undated) DEVICE — FORCEP CURVED DISP

## (undated) DEVICE — BLADE SURG #15 CARBON STEEL

## (undated) DEVICE — SYR 10CC LUER LOCK

## (undated) DEVICE — GOWN SURGICAL X-LARGE

## (undated) DEVICE — NDL HYPO A BEVEL 30X1/2

## (undated) DEVICE — APPLICATOR STERILE 3IN

## (undated) DEVICE — DRAPE EENT SPLIT STERILE

## (undated) DEVICE — SUCTION SURGICAL STR 7FR

## (undated) DEVICE — DROPPER MEDICINE

## (undated) DEVICE — SOL WATER STRL IRR 1000ML

## (undated) DEVICE — PROTECTOR CORNEAL CROUCH ST